# Patient Record
Sex: FEMALE | Race: BLACK OR AFRICAN AMERICAN | NOT HISPANIC OR LATINO | Employment: FULL TIME | URBAN - METROPOLITAN AREA
[De-identification: names, ages, dates, MRNs, and addresses within clinical notes are randomized per-mention and may not be internally consistent; named-entity substitution may affect disease eponyms.]

---

## 2017-01-25 ENCOUNTER — HOSPITAL ENCOUNTER (OUTPATIENT)
Dept: RADIOLOGY | Facility: HOSPITAL | Age: 47
Discharge: HOME | End: 2017-01-25
Attending: FAMILY MEDICINE
Payer: COMMERCIAL

## 2017-01-25 DIAGNOSIS — Z12.31 OTHER SCREENING MAMMOGRAM: ICD-10-CM

## 2017-01-25 PROCEDURE — 77067 SCR MAMMO BI INCL CAD: CPT | Mod: TC

## 2017-01-25 PROCEDURE — 77067 SCR MAMMO BI INCL CAD: CPT | Mod: 26,,, | Performed by: RADIOLOGY

## 2017-03-26 RX ORDER — ERGOCALCIFEROL 1.25 MG/1
CAPSULE ORAL
Qty: 8 CAPSULE | Refills: 0 | Status: SHIPPED | OUTPATIENT
Start: 2017-03-26 | End: 2017-06-26 | Stop reason: SDUPTHER

## 2017-03-26 RX ORDER — SIMVASTATIN 20 MG/1
TABLET, FILM COATED ORAL
Qty: 30 TABLET | Refills: 0 | Status: SHIPPED | OUTPATIENT
Start: 2017-03-26 | End: 2017-06-26 | Stop reason: SDUPTHER

## 2017-03-28 RX ORDER — OLMESARTAN MEDOXOMIL AND HYDROCHLOROTHIAZIDE 40/12.5 40; 12.5 MG/1; MG/1
TABLET ORAL
Qty: 30 TABLET | Refills: 0 | Status: SHIPPED | OUTPATIENT
Start: 2017-03-28 | End: 2017-07-03 | Stop reason: SDUPTHER

## 2017-04-06 RX ORDER — IBUPROFEN 800 MG/1
TABLET ORAL
Qty: 30 TABLET | Refills: 0 | Status: SHIPPED | OUTPATIENT
Start: 2017-04-06 | End: 2017-11-16 | Stop reason: SDUPTHER

## 2017-04-06 RX ORDER — CYCLOBENZAPRINE HCL 10 MG
TABLET ORAL
Qty: 30 TABLET | Refills: 0 | Status: SHIPPED | OUTPATIENT
Start: 2017-04-06 | End: 2017-11-16 | Stop reason: SDUPTHER

## 2017-07-03 RX ORDER — OLMESARTAN MEDOXOMIL AND HYDROCHLOROTHIAZIDE 40/12.5 40; 12.5 MG/1; MG/1
1 TABLET ORAL DAILY
Qty: 30 TABLET | Refills: 0 | Status: SHIPPED | OUTPATIENT
Start: 2017-07-03 | End: 2017-11-16 | Stop reason: SDUPTHER

## 2017-07-03 RX ORDER — ERGOCALCIFEROL 1.25 MG/1
CAPSULE ORAL
Qty: 12 CAPSULE | Refills: 0 | Status: SHIPPED | OUTPATIENT
Start: 2017-07-03 | End: 2017-11-16 | Stop reason: SDUPTHER

## 2017-07-03 RX ORDER — OLMESARTAN MEDOXOMIL AND HYDROCHLOROTHIAZIDE 40/12.5 40; 12.5 MG/1; MG/1
TABLET ORAL
Qty: 30 TABLET | Refills: 1 | Status: SHIPPED | OUTPATIENT
Start: 2017-07-03 | End: 2017-08-28 | Stop reason: SDUPTHER

## 2017-07-03 RX ORDER — SIMVASTATIN 20 MG/1
TABLET, FILM COATED ORAL
Qty: 90 TABLET | Refills: 0 | Status: SHIPPED | OUTPATIENT
Start: 2017-07-03 | End: 2017-11-16 | Stop reason: SDUPTHER

## 2017-07-03 NOTE — TELEPHONE ENCOUNTER
----- Message from Graciela Ruiz sent at 7/3/2017  8:21 AM CDT -----  Contact: Pt   States she is rtn nurses call rg her rx req and states to pls leave a message when calling back due to being unable to answer and can be reached at [phone]//[email or web address]

## 2017-08-28 ENCOUNTER — OFFICE VISIT (OUTPATIENT)
Dept: FAMILY MEDICINE | Facility: CLINIC | Age: 47
End: 2017-08-28
Payer: COMMERCIAL

## 2017-08-28 ENCOUNTER — LAB VISIT (OUTPATIENT)
Dept: LAB | Facility: HOSPITAL | Age: 47
End: 2017-08-28
Attending: FAMILY MEDICINE
Payer: COMMERCIAL

## 2017-08-28 VITALS
RESPIRATION RATE: 18 BRPM | OXYGEN SATURATION: 96 % | WEIGHT: 293 LBS | HEIGHT: 66 IN | SYSTOLIC BLOOD PRESSURE: 148 MMHG | DIASTOLIC BLOOD PRESSURE: 94 MMHG | TEMPERATURE: 96 F | BODY MASS INDEX: 47.09 KG/M2 | HEART RATE: 91 BPM

## 2017-08-28 DIAGNOSIS — E78.5 HYPERLIPIDEMIA, UNSPECIFIED HYPERLIPIDEMIA TYPE: ICD-10-CM

## 2017-08-28 DIAGNOSIS — M25.50 PAIN, JOINT, MULTIPLE SITES: ICD-10-CM

## 2017-08-28 DIAGNOSIS — E88.819 INSULIN RESISTANCE: ICD-10-CM

## 2017-08-28 DIAGNOSIS — E55.9 VITAMIN D DEFICIENCY DISEASE: ICD-10-CM

## 2017-08-28 DIAGNOSIS — I10 ESSENTIAL HYPERTENSION: Primary | ICD-10-CM

## 2017-08-28 DIAGNOSIS — I10 ESSENTIAL HYPERTENSION: ICD-10-CM

## 2017-08-28 LAB
25(OH)D3+25(OH)D2 SERPL-MCNC: 21 NG/ML
ALBUMIN SERPL BCP-MCNC: 3.5 G/DL
ALP SERPL-CCNC: 79 U/L
ALT SERPL W/O P-5'-P-CCNC: 12 U/L
ANION GAP SERPL CALC-SCNC: 8 MMOL/L
AST SERPL-CCNC: 15 U/L
BASOPHILS # BLD AUTO: 0.02 K/UL
BASOPHILS NFR BLD: 0.3 %
BILIRUB SERPL-MCNC: 0.6 MG/DL
BUN SERPL-MCNC: 14 MG/DL
CALCIUM SERPL-MCNC: 9.1 MG/DL
CCP AB SER IA-ACNC: <0.5 U/ML
CHLORIDE SERPL-SCNC: 106 MMOL/L
CHOLEST/HDLC SERPL: 3 {RATIO}
CO2 SERPL-SCNC: 25 MMOL/L
CREAT SERPL-MCNC: 0.9 MG/DL
CRP SERPL-MCNC: 7.2 MG/L
DIFFERENTIAL METHOD: ABNORMAL
EOSINOPHIL # BLD AUTO: 0 K/UL
EOSINOPHIL NFR BLD: 0.7 %
ERYTHROCYTE [DISTWIDTH] IN BLOOD BY AUTOMATED COUNT: 14.9 %
ERYTHROCYTE [SEDIMENTATION RATE] IN BLOOD BY WESTERGREN METHOD: 13 MM/HR
EST. GFR  (AFRICAN AMERICAN): >60 ML/MIN/1.73 M^2
EST. GFR  (NON AFRICAN AMERICAN): >60 ML/MIN/1.73 M^2
GLUCOSE SERPL-MCNC: 76 MG/DL
HCT VFR BLD AUTO: 41.1 %
HDL/CHOLESTEROL RATIO: 33 %
HDLC SERPL-MCNC: 212 MG/DL
HDLC SERPL-MCNC: 70 MG/DL
HGB BLD-MCNC: 13.2 G/DL
INSULIN COLLECTION INTERVAL: NORMAL
INSULIN SERPL-ACNC: 9.9 UU/ML
LDLC SERPL CALC-MCNC: 127 MG/DL
LYMPHOCYTES # BLD AUTO: 1.9 K/UL
LYMPHOCYTES NFR BLD: 33.2 %
MCH RBC QN AUTO: 25.3 PG
MCHC RBC AUTO-ENTMCNC: 32.1 G/DL
MCV RBC AUTO: 79 FL
MONOCYTES # BLD AUTO: 0.3 K/UL
MONOCYTES NFR BLD: 5.2 %
NEUTROPHILS # BLD AUTO: 3.5 K/UL
NEUTROPHILS NFR BLD: 60.3 %
NONHDLC SERPL-MCNC: 142 MG/DL
PLATELET # BLD AUTO: 201 K/UL
PMV BLD AUTO: 11 FL
POTASSIUM SERPL-SCNC: 3.9 MMOL/L
PROT SERPL-MCNC: 7.8 G/DL
RBC # BLD AUTO: 5.21 M/UL
RHEUMATOID FACT SERPL-ACNC: 13 IU/ML
SODIUM SERPL-SCNC: 139 MMOL/L
TRIGL SERPL-MCNC: 75 MG/DL
TSH SERPL DL<=0.005 MIU/L-ACNC: 3.74 UIU/ML
WBC # BLD AUTO: 5.81 K/UL

## 2017-08-28 PROCEDURE — 84443 ASSAY THYROID STIM HORMONE: CPT

## 2017-08-28 PROCEDURE — 85651 RBC SED RATE NONAUTOMATED: CPT

## 2017-08-28 PROCEDURE — 86200 CCP ANTIBODY: CPT

## 2017-08-28 PROCEDURE — 80053 COMPREHEN METABOLIC PANEL: CPT

## 2017-08-28 PROCEDURE — 83525 ASSAY OF INSULIN: CPT

## 2017-08-28 PROCEDURE — 86431 RHEUMATOID FACTOR QUANT: CPT

## 2017-08-28 PROCEDURE — 99214 OFFICE O/P EST MOD 30 MIN: CPT | Mod: S$GLB,,, | Performed by: FAMILY MEDICINE

## 2017-08-28 PROCEDURE — 80061 LIPID PANEL: CPT

## 2017-08-28 PROCEDURE — 86038 ANTINUCLEAR ANTIBODIES: CPT

## 2017-08-28 PROCEDURE — 85025 COMPLETE CBC W/AUTO DIFF WBC: CPT

## 2017-08-28 PROCEDURE — 3077F SYST BP >= 140 MM HG: CPT | Mod: S$GLB,,, | Performed by: FAMILY MEDICINE

## 2017-08-28 PROCEDURE — 86140 C-REACTIVE PROTEIN: CPT

## 2017-08-28 PROCEDURE — 86039 ANTINUCLEAR ANTIBODIES (ANA): CPT

## 2017-08-28 PROCEDURE — 99999 PR PBB SHADOW E&M-EST. PATIENT-LVL IV: CPT | Mod: PBBFAC,,, | Performed by: FAMILY MEDICINE

## 2017-08-28 PROCEDURE — 82306 VITAMIN D 25 HYDROXY: CPT

## 2017-08-28 PROCEDURE — 86225 DNA ANTIBODY NATIVE: CPT

## 2017-08-28 PROCEDURE — 86235 NUCLEAR ANTIGEN ANTIBODY: CPT | Mod: 59

## 2017-08-28 PROCEDURE — 36415 COLL VENOUS BLD VENIPUNCTURE: CPT | Mod: PO

## 2017-08-28 PROCEDURE — 3008F BODY MASS INDEX DOCD: CPT | Mod: S$GLB,,, | Performed by: FAMILY MEDICINE

## 2017-08-28 PROCEDURE — 83036 HEMOGLOBIN GLYCOSYLATED A1C: CPT

## 2017-08-28 PROCEDURE — 3080F DIAST BP >= 90 MM HG: CPT | Mod: S$GLB,,, | Performed by: FAMILY MEDICINE

## 2017-08-28 NOTE — PROGRESS NOTES
Subjective:       Patient ID: Aliza Sagastume is a 46 y.o. female.    Chief Complaint: Knee Pain; Hip Pain; and Ankle Pain    Ms. Beavers comes in today with complaint of having pain at her hips, knees and ankles (more so at left knee and right ankle).  She reports pain occurs intermittently and for some time.  She reports knee pains at 4-5/10 on pain scale today and more painful with bending.  She states she participates in [place] 2 times a week.  She states her discomforts improve as she moves around.  She denies associated trauma but reports swelling in her ankles which resolves with elevation.  She states she takes ibuprofen prn and takes muscle relaxant less as she states it makes her sleepy.  She states medications do help for pain.    She is fasting and has not taken medication today.    She denies having fever, chills, fatigue, appetite change; shortness of breath, cough, wheezing; chest pain, palpitations, leg swelling; abdominal pain, nausea, vomiting, diarrhea, constipation; unusual urinary symptoms; back pain; headaches; anxiety or depression.    Current Outpatient Prescriptions:  CYANOCOBALAMIN, VITAMIN B-12, (VITAMIN B-12 ORAL), Take by mouth.  cyclobenzaprine (FLEXERIL) 10 MG tablet, TAKE 1 TABLET(10 MG) BY MOUTH EVERY EVENING AS NEEDED FOR MUSCLE SPASMS  ergocalciferol (ERGOCALCIFEROL) 50,000 unit Cap, TAKE 1 CAPSULE BY MOUTH TWICE WEEKLY(ON MONDAY AND FRIDAY)  ibuprofen (ADVIL,MOTRIN) 800 MG tablet, TAKE 1 TABLET BY MOUTH TWICE DAILY WITH MEALS  multivitamin capsule, Take 1 capsule by mouth once daily.  olmesartan-hydrochlorothiazide (BENICAR HCT) 40-12.5 mg Tab, Take 1 tablet by mouth once daily.  simvastatin (ZOCOR) 20 MG tablet, TAKE 1 TABLET(20 MG) BY MOUTH EVERY EVENING          Knee Pain      Hip Pain      Ankle Pain        Review of Systems   Constitutional: Positive for activity change and unexpected weight change. Negative for appetite change, chills, fatigue and fever.   HENT: Negative  for hearing loss, rhinorrhea and trouble swallowing.    Eyes: Positive for visual disturbance. Negative for discharge.   Respiratory: Negative for cough, chest tightness, shortness of breath and wheezing.    Cardiovascular: Negative for chest pain and palpitations.   Gastrointestinal: Negative for blood in stool, constipation, diarrhea, nausea and vomiting.   Endocrine: Positive for polyuria. Negative for polydipsia.   Genitourinary: Negative for difficulty urinating, dysuria, hematuria and menstrual problem.   Musculoskeletal: Positive for arthralgias and joint swelling. Negative for back pain and neck pain.   Neurological: Positive for weakness and headaches.   Psychiatric/Behavioral: Positive for dysphoric mood. Negative for confusion. The patient is not nervous/anxious.        Objective:      Physical Exam   Constitutional: She is oriented to person, place, and time. She appears well-developed and well-nourished. No distress.   Pleasant.   Neck: Normal range of motion. Neck supple. No thyromegaly present.   Cardiovascular: Normal rate, regular rhythm, normal heart sounds and intact distal pulses.    No murmur heard.  Pulmonary/Chest: Effort normal and breath sounds normal. No respiratory distress. She has no wheezes.   Abdominal: Soft. Bowel sounds are normal. She exhibits no distension and no mass. There is no tenderness. There is no rebound and no guarding.   Musculoskeletal: Normal range of motion. She exhibits tenderness. She exhibits no edema.   Non tender back, hips, shoulders, knees, ankles with full range of motion noted.  Slight crepitance at left knee noted.  She is ambulatory without problems.   Lymphadenopathy:     She has no cervical adenopathy.   Neurological: She is alert and oriented to person, place, and time.   Skin: She is not diaphoretic.   Psychiatric: She has a normal mood and affect. Her behavior is normal. Judgment and thought content normal.   Vitals reviewed.      Assessment:       1.  Essential hypertension    2. Hyperlipidemia, unspecified hyperlipidemia type    3. Vitamin D deficiency disease    4. Insulin resistance    5. Pain, joint, multiple sites        Plan:       1.  Labs:  TSH, A1c, lipid panel, Insulin, CMP, CBC, Urinalysis, Vitamin D, CEDRICK, CRP, ESR, RF, CCP-Antibody.  Patient advised to call for results/further recommendations.  2.  Continue current medications, follow low sodium, low cholesterol, low carb diet, daily walks.

## 2017-08-29 LAB
ANA SER QL IF: POSITIVE
ANA TITR SER IF: NORMAL {TITER}
ESTIMATED AVG GLUCOSE: 123 MG/DL
HBA1C MFR BLD HPLC: 5.9 %

## 2017-08-31 LAB
ANTI SM ANTIBODY: 1.05 EU
ANTI SM/RNP ANTIBODY: 1.4 EU
ANTI-SM INTERPRETATION: NEGATIVE
ANTI-SM/RNP INTERPRETATION: NEGATIVE
ANTI-SSA ANTIBODY: 0.9 EU
ANTI-SSA INTERPRETATION: NEGATIVE
ANTI-SSB ANTIBODY: 0.76 EU
ANTI-SSB INTERPRETATION: NEGATIVE
DSDNA AB SER-ACNC: NORMAL [IU]/ML

## 2017-11-17 RX ORDER — SIMVASTATIN 20 MG/1
TABLET, FILM COATED ORAL
Qty: 90 TABLET | Refills: 0 | Status: SHIPPED | OUTPATIENT
Start: 2017-11-17 | End: 2018-02-22 | Stop reason: SDUPTHER

## 2017-11-17 RX ORDER — OLMESARTAN MEDOXOMIL AND HYDROCHLOROTHIAZIDE 40/12.5 40; 12.5 MG/1; MG/1
1 TABLET ORAL DAILY
Qty: 90 TABLET | Refills: 0 | Status: SHIPPED | OUTPATIENT
Start: 2017-11-17 | End: 2017-12-14 | Stop reason: SDUPTHER

## 2017-11-17 RX ORDER — ERGOCALCIFEROL 1.25 MG/1
CAPSULE ORAL
Qty: 12 CAPSULE | Refills: 0 | Status: SHIPPED | OUTPATIENT
Start: 2017-11-17 | End: 2017-12-14 | Stop reason: SDUPTHER

## 2017-11-17 RX ORDER — IBUPROFEN 800 MG/1
800 TABLET ORAL 2 TIMES DAILY WITH MEALS
Qty: 30 TABLET | Refills: 0 | Status: SHIPPED | OUTPATIENT
Start: 2017-11-17 | End: 2017-12-14 | Stop reason: SDUPTHER

## 2017-11-17 RX ORDER — CYCLOBENZAPRINE HCL 10 MG
TABLET ORAL
Qty: 30 TABLET | Refills: 0 | Status: SHIPPED | OUTPATIENT
Start: 2017-11-17 | End: 2018-02-22 | Stop reason: SDUPTHER

## 2017-11-22 ENCOUNTER — TELEPHONE (OUTPATIENT)
Dept: FAMILY MEDICINE | Facility: CLINIC | Age: 47
End: 2017-11-22

## 2017-11-22 NOTE — TELEPHONE ENCOUNTER
----- Message from Magdy Floyd sent at 11/22/2017  8:20 AM CST -----  Contact: Pt  Pt states she is returning the nurse call, please contact the pt at [phone]

## 2017-12-15 RX ORDER — ERGOCALCIFEROL 1.25 MG/1
CAPSULE ORAL
Qty: 12 CAPSULE | Refills: 0 | Status: SHIPPED | OUTPATIENT
Start: 2017-12-15 | End: 2018-02-22 | Stop reason: SDUPTHER

## 2017-12-15 RX ORDER — IBUPROFEN 800 MG/1
800 TABLET ORAL 2 TIMES DAILY WITH MEALS
Qty: 30 TABLET | Refills: 2 | Status: SHIPPED | OUTPATIENT
Start: 2017-12-15 | End: 2018-02-22 | Stop reason: SDUPTHER

## 2017-12-15 RX ORDER — OLMESARTAN MEDOXOMIL AND HYDROCHLOROTHIAZIDE 40/12.5 40; 12.5 MG/1; MG/1
1 TABLET ORAL DAILY
Qty: 90 TABLET | Refills: 0 | Status: SHIPPED | OUTPATIENT
Start: 2017-12-15 | End: 2018-02-22 | Stop reason: SDUPTHER

## 2018-02-07 ENCOUNTER — PATIENT OUTREACH (OUTPATIENT)
Dept: ADMINISTRATIVE | Facility: HOSPITAL | Age: 48
End: 2018-02-07

## 2018-02-26 ENCOUNTER — TELEPHONE (OUTPATIENT)
Dept: FAMILY MEDICINE | Facility: CLINIC | Age: 48
End: 2018-02-26

## 2018-02-26 NOTE — TELEPHONE ENCOUNTER
----- Message from Oxana Heaton sent at 2/26/2018  1:56 PM CST -----  Please call pt back at [phone]. Pt miss call on Friday.

## 2018-02-26 NOTE — TELEPHONE ENCOUNTER
----- Message from Kayla Carrasco sent at 2/26/2018  2:37 PM CST -----  Contact: Patient  Patient is returning a call, please call them back at [phone]. Thank you

## 2018-03-01 RX ORDER — CYCLOBENZAPRINE HCL 10 MG
TABLET ORAL
Qty: 30 TABLET | Refills: 0 | Status: SHIPPED | OUTPATIENT
Start: 2018-03-01 | End: 2018-06-15

## 2018-03-01 RX ORDER — IBUPROFEN 800 MG/1
800 TABLET ORAL 2 TIMES DAILY WITH MEALS
Qty: 30 TABLET | Refills: 2 | Status: SHIPPED | OUTPATIENT
Start: 2018-03-01 | End: 2018-06-15

## 2018-03-01 RX ORDER — OLMESARTAN MEDOXOMIL AND HYDROCHLOROTHIAZIDE 40/12.5 40; 12.5 MG/1; MG/1
1 TABLET ORAL DAILY
Qty: 90 TABLET | Refills: 0 | Status: SHIPPED | OUTPATIENT
Start: 2018-03-01 | End: 2018-06-15 | Stop reason: DRUGHIGH

## 2018-03-01 RX ORDER — SIMVASTATIN 20 MG/1
TABLET, FILM COATED ORAL
Qty: 90 TABLET | Refills: 0 | Status: SHIPPED | OUTPATIENT
Start: 2018-03-01 | End: 2018-06-18 | Stop reason: SDUPTHER

## 2018-03-01 RX ORDER — ERGOCALCIFEROL 1.25 MG/1
CAPSULE ORAL
Qty: 12 CAPSULE | Refills: 0 | Status: SHIPPED | OUTPATIENT
Start: 2018-03-01 | End: 2018-07-25 | Stop reason: SDUPTHER

## 2018-03-01 NOTE — TELEPHONE ENCOUNTER
"Please advise pt I attempted to reach her by phone for "correct" pharmacy as a [facility] was listed on her email request. I hope we got it correct. Thanks.  "

## 2018-03-26 RX ORDER — IBUPROFEN 800 MG/1
TABLET ORAL
Qty: 30 TABLET | Refills: 0 | Status: SHIPPED | OUTPATIENT
Start: 2018-03-26 | End: 2019-04-30 | Stop reason: SDUPTHER

## 2018-03-26 RX ORDER — CYCLOBENZAPRINE HCL 10 MG
TABLET ORAL
Qty: 30 TABLET | Refills: 0 | Status: SHIPPED | OUTPATIENT
Start: 2018-03-26 | End: 2019-04-30 | Stop reason: SDUPTHER

## 2018-05-03 ENCOUNTER — TELEPHONE (OUTPATIENT)
Dept: FAMILY MEDICINE | Facility: CLINIC | Age: 48
End: 2018-05-03

## 2018-05-03 NOTE — TELEPHONE ENCOUNTER
----- Message from Ligia Andino sent at 5/3/2018  8:01 AM CDT -----  Contact: pt  The pt request a call concerning her  5/28 appt and the pt wants to schedule a mammo, no orders in the system, the pt can be reached at [phone]///[email or web address]

## 2018-05-15 ENCOUNTER — PATIENT OUTREACH (OUTPATIENT)
Dept: ADMINISTRATIVE | Facility: HOSPITAL | Age: 48
End: 2018-05-15

## 2018-06-10 DIAGNOSIS — Z12.11 SPECIAL SCREENING FOR MALIGNANT NEOPLASMS, COLON: Primary | ICD-10-CM

## 2018-06-15 ENCOUNTER — OFFICE VISIT (OUTPATIENT)
Dept: CARDIOLOGY | Facility: CLINIC | Age: 48
End: 2018-06-15
Payer: COMMERCIAL

## 2018-06-15 VITALS
HEART RATE: 78 BPM | BODY MASS INDEX: 47.09 KG/M2 | DIASTOLIC BLOOD PRESSURE: 82 MMHG | SYSTOLIC BLOOD PRESSURE: 140 MMHG | WEIGHT: 293 LBS | HEIGHT: 66 IN

## 2018-06-15 DIAGNOSIS — E66.9 OBESITY, UNSPECIFIED CLASSIFICATION, UNSPECIFIED OBESITY TYPE, UNSPECIFIED WHETHER SERIOUS COMORBIDITY PRESENT: ICD-10-CM

## 2018-06-15 DIAGNOSIS — I10 ESSENTIAL HYPERTENSION: Primary | ICD-10-CM

## 2018-06-15 DIAGNOSIS — R06.83 SNORES: ICD-10-CM

## 2018-06-15 DIAGNOSIS — E78.5 HYPERLIPIDEMIA, UNSPECIFIED HYPERLIPIDEMIA TYPE: ICD-10-CM

## 2018-06-15 DIAGNOSIS — I10 HYPERTENSION, UNSPECIFIED TYPE: ICD-10-CM

## 2018-06-15 DIAGNOSIS — I73.9 PVD (PERIPHERAL VASCULAR DISEASE): ICD-10-CM

## 2018-06-15 PROCEDURE — 99204 OFFICE O/P NEW MOD 45 MIN: CPT | Mod: S$GLB,,, | Performed by: INTERNAL MEDICINE

## 2018-06-15 PROCEDURE — 99999 PR PBB SHADOW E&M-EST. PATIENT-LVL IV: CPT | Mod: PBBFAC,,, | Performed by: INTERNAL MEDICINE

## 2018-06-15 PROCEDURE — 93000 ELECTROCARDIOGRAM COMPLETE: CPT | Mod: S$GLB,,, | Performed by: NUCLEAR MEDICINE

## 2018-06-15 RX ORDER — OLMESARTAN MEDOXOMIL AND HYDROCHLOROTHIAZIDE 40/25 40; 25 MG/1; MG/1
1 TABLET ORAL DAILY
Qty: 30 TABLET | Refills: 11 | Status: SHIPPED | OUTPATIENT
Start: 2018-06-15 | End: 2018-06-18 | Stop reason: SDUPTHER

## 2018-06-15 NOTE — PROGRESS NOTES
Subjective:   Patient ID:  Aliza Sagastume is a 47 y.o. female who presents for evaluation of Establish Care (screening)      46 yo, female, referred for care establish. Office work  PMH HTN, HLD, obesity, preDM, snoring.  Feels fatigue.  Some sharp chest pain, on-and-off. Once a month.   LEAL if walking fast.  Has knee pain while walking  Ankle well at night and better in morning.   Pre-screen HARRIET at the weekend showed mild Dz.  EKG NSR  No smoking and occasional drinking  Father 1st heart attack at 60. Mother HTN. No f/h premature CAD.          Past Medical History:   Diagnosis Date    CEDRICK positive 08/2017    High cholesterol     History of vitamin D deficiency     Hypertension     Hypothyroidism     Kidney stones     Pre-diabetes     Snoring        Past Surgical History:   Procedure Laterality Date    CYSTOSCOPY W/ LASER LITHOTRIPSY      x 2    DILATION AND CURETTAGE OF UTERUS      x1 for abnormal bleeding       Social History   Substance Use Topics    Smoking status: Never Smoker    Smokeless tobacco: Never Used    Alcohol use No       Family History   Problem Relation Age of Onset    Hypertension Mother     Heart disease Father 69        MI    Hypertension Father     Diabetes Father     Cancer Father         prostate cancer    Hypertension Sister     Cancer Paternal Aunt         Brain cancer    Cancer Paternal Uncle         Pancreas cancer    Hypertension Maternal Grandmother     Hypertension Maternal Grandfather     Stroke Maternal Grandfather 86    Hypertension Paternal Grandmother     Hypertension Paternal Grandfather     No Known Problems Daughter     No Known Problems Daughter     Cancer Cousin         Breast cancer       Review of Systems   Constitution: Positive for malaise/fatigue. Negative for decreased appetite, diaphoresis, fever, weakness and night sweats.   HENT: Negative for nosebleeds.    Eyes: Negative for blurred vision and double vision.   Cardiovascular:  Positive for dyspnea on exertion. Negative for chest pain, claudication, irregular heartbeat, leg swelling, near-syncope, orthopnea, palpitations, paroxysmal nocturnal dyspnea and syncope.   Respiratory: Positive for snoring. Negative for cough, shortness of breath, sleep disturbances due to breathing, sputum production and wheezing.    Endocrine: Negative for cold intolerance and polyuria.   Hematologic/Lymphatic: Does not bruise/bleed easily.   Skin: Negative for rash.   Musculoskeletal: Negative for back pain, falls, joint pain, joint swelling and neck pain.   Gastrointestinal: Negative for abdominal pain, heartburn, nausea and vomiting.   Genitourinary: Negative for dysuria, frequency and hematuria.   Neurological: Negative for difficulty with concentration, dizziness, focal weakness, headaches, light-headedness, numbness and seizures.   Psychiatric/Behavioral: Negative for depression, memory loss and substance abuse. The patient does not have insomnia.    Allergic/Immunologic: Negative for HIV exposure and hives.       Objective:   Physical Exam   Constitutional: She is oriented to person, place, and time. She appears well-nourished.   HENT:   Head: Normocephalic.   Eyes: Pupils are equal, round, and reactive to light.   Neck: Normal carotid pulses and no JVD present. Carotid bruit is not present. No thyromegaly present.   Cardiovascular: Normal rate, regular rhythm, normal heart sounds and normal pulses.   No extrasystoles are present. PMI is not displaced.  Exam reveals no gallop and no S3.    No murmur heard.  Pulmonary/Chest: Breath sounds normal. No stridor. No respiratory distress.   Abdominal: Soft. Bowel sounds are normal. There is no tenderness. There is no rebound.   Musculoskeletal: Normal range of motion.   Neurological: She is alert and oriented to person, place, and time.   Skin: Skin is intact. No rash noted.   Psychiatric: Her behavior is normal.       Lab Results   Component Value Date    CHOL  212 (H) 08/28/2017    CHOL 224 (H) 03/15/2016    CHOL 182 05/28/2013     Lab Results   Component Value Date    HDL 70 08/28/2017    HDL 77 (H) 03/15/2016    HDL 54 05/28/2013     Lab Results   Component Value Date    LDLCALC 127.0 08/28/2017    LDLCALC 131.8 03/15/2016    LDLCALC 115.0 05/28/2013     Lab Results   Component Value Date    TRIG 75 08/28/2017    TRIG 76 03/15/2016    TRIG 65 05/28/2013     Lab Results   Component Value Date    CHOLHDL 33.0 08/28/2017    CHOLHDL 34.4 03/15/2016    CHOLHDL 29.7 05/28/2013       Chemistry        Component Value Date/Time     08/28/2017 1416    K 3.9 08/28/2017 1416     08/28/2017 1416    CO2 25 08/28/2017 1416    BUN 14 08/28/2017 1416    CREATININE 0.9 08/28/2017 1416    GLU 76 08/28/2017 1416        Component Value Date/Time    CALCIUM 9.1 08/28/2017 1416    ALKPHOS 79 08/28/2017 1416    AST 15 08/28/2017 1416    ALT 12 08/28/2017 1416    BILITOT 0.6 08/28/2017 1416    ESTGFRAFRICA >60.0 08/28/2017 1416    EGFRNONAA >60.0 08/28/2017 1416          Lab Results   Component Value Date    TSH 3.741 08/28/2017     Lab Results   Component Value Date    INR 1.0 07/30/2013     Lab Results   Component Value Date    WBC 5.81 08/28/2017    HGB 13.2 08/28/2017    HCT 41.1 08/28/2017    MCV 79 (L) 08/28/2017     08/28/2017     BNP  @LABRCNTIP(BNP,BNPTRIAGEBLO)@  CrCl cannot be calculated (Patient's most recent lab result is older than the maximum 7 days allowed.).     Assessment:      1. Essential hypertension    2. Hyperlipidemia, unspecified hyperlipidemia type    3. Obesity, unspecified classification, unspecified obesity type, unspecified whether serious comorbidity present    4. Snores    5. HTN (hypertension)    6. PVD (peripheral vascular disease)      Ankle swelling due to venous insufficiency  Snore and  Fatigue needs r/o SHAWNA  EKG NSR and LAE  Plan:   HARRIET for leg pain  ECHO  Refer to sleep study  Increase HCTZ to 25 mg daily  DASH  Weight control  Continue  zocor.  F/u with PCP

## 2018-06-18 DIAGNOSIS — I73.9 PVD (PERIPHERAL VASCULAR DISEASE): ICD-10-CM

## 2018-06-18 RX ORDER — SIMVASTATIN 20 MG/1
TABLET, FILM COATED ORAL
Qty: 90 TABLET | Refills: 0 | Status: SHIPPED | OUTPATIENT
Start: 2018-06-18 | End: 2018-07-25 | Stop reason: SDUPTHER

## 2018-06-18 RX ORDER — OLMESARTAN MEDOXOMIL AND HYDROCHLOROTHIAZIDE 40/25 40; 25 MG/1; MG/1
1 TABLET ORAL DAILY
Qty: 30 TABLET | Refills: 11 | Status: SHIPPED | OUTPATIENT
Start: 2018-06-18 | End: 2018-07-25 | Stop reason: SDUPTHER

## 2018-06-18 RX ORDER — OLMESARTAN MEDOXOMIL AND HYDROCHLOROTHIAZIDE 40/12.5 40; 12.5 MG/1; MG/1
TABLET ORAL
Qty: 30 TABLET | Refills: 0 | Status: SHIPPED | OUTPATIENT
Start: 2018-06-18 | End: 2018-07-25

## 2018-06-18 RX ORDER — ERGOCALCIFEROL 1.25 MG/1
CAPSULE ORAL
Qty: 12 CAPSULE | Refills: 0 | Status: SHIPPED | OUTPATIENT
Start: 2018-06-18 | End: 2018-07-25 | Stop reason: SDUPTHER

## 2018-06-22 ENCOUNTER — TELEPHONE (OUTPATIENT)
Dept: CARDIOLOGY | Facility: CLINIC | Age: 48
End: 2018-06-22

## 2018-06-22 NOTE — TELEPHONE ENCOUNTER
Pt cx echo and HARRIET stating:    I received a message that my insurance requires me to pay $350 deductible today. I am not prepared to pay that today, therefore I must cancel this appointment.

## 2018-07-12 ENCOUNTER — PATIENT MESSAGE (OUTPATIENT)
Dept: ADMINISTRATIVE | Facility: OTHER | Age: 48
End: 2018-07-12

## 2018-07-25 ENCOUNTER — LAB VISIT (OUTPATIENT)
Dept: LAB | Facility: HOSPITAL | Age: 48
End: 2018-07-25
Attending: FAMILY MEDICINE
Payer: COMMERCIAL

## 2018-07-25 ENCOUNTER — OFFICE VISIT (OUTPATIENT)
Dept: FAMILY MEDICINE | Facility: CLINIC | Age: 48
End: 2018-07-25
Payer: COMMERCIAL

## 2018-07-25 VITALS
WEIGHT: 293 LBS | SYSTOLIC BLOOD PRESSURE: 162 MMHG | OXYGEN SATURATION: 100 % | TEMPERATURE: 99 F | BODY MASS INDEX: 47.09 KG/M2 | HEIGHT: 66 IN | HEART RATE: 74 BPM | DIASTOLIC BLOOD PRESSURE: 104 MMHG

## 2018-07-25 DIAGNOSIS — Z00.00 ANNUAL PHYSICAL EXAM: Primary | ICD-10-CM

## 2018-07-25 DIAGNOSIS — E78.5 HYPERLIPIDEMIA, UNSPECIFIED HYPERLIPIDEMIA TYPE: ICD-10-CM

## 2018-07-25 DIAGNOSIS — R73.03 PRE-DIABETES: ICD-10-CM

## 2018-07-25 DIAGNOSIS — E88.819 INSULIN RESISTANCE: ICD-10-CM

## 2018-07-25 DIAGNOSIS — I73.9 PVD (PERIPHERAL VASCULAR DISEASE): ICD-10-CM

## 2018-07-25 DIAGNOSIS — E55.9 VITAMIN D DEFICIENCY DISEASE: ICD-10-CM

## 2018-07-25 DIAGNOSIS — E66.01 MORBID OBESITY WITH BMI OF 50.0-59.9, ADULT: ICD-10-CM

## 2018-07-25 DIAGNOSIS — Z00.00 ANNUAL PHYSICAL EXAM: ICD-10-CM

## 2018-07-25 DIAGNOSIS — Z12.31 VISIT FOR SCREENING MAMMOGRAM: ICD-10-CM

## 2018-07-25 DIAGNOSIS — I10 HYPERTENSION, UNSPECIFIED TYPE: ICD-10-CM

## 2018-07-25 LAB
25(OH)D3+25(OH)D2 SERPL-MCNC: 16 NG/ML
ALBUMIN SERPL BCP-MCNC: 3.7 G/DL
ALP SERPL-CCNC: 80 U/L
ALT SERPL W/O P-5'-P-CCNC: 11 U/L
AMORPH CRY UR QL COMP ASSIST: ABNORMAL
ANION GAP SERPL CALC-SCNC: 10 MMOL/L
AST SERPL-CCNC: 15 U/L
BASOPHILS # BLD AUTO: 0.03 K/UL
BASOPHILS NFR BLD: 0.5 %
BILIRUB SERPL-MCNC: 0.4 MG/DL
BILIRUB UR QL STRIP: NEGATIVE
BUN SERPL-MCNC: 20 MG/DL
CALCIUM SERPL-MCNC: 8.9 MG/DL
CAOX CRY UR QL COMP ASSIST: ABNORMAL
CHLORIDE SERPL-SCNC: 107 MMOL/L
CHOLEST SERPL-MCNC: 211 MG/DL
CHOLEST/HDLC SERPL: 2.9 {RATIO}
CLARITY UR REFRACT.AUTO: ABNORMAL
CO2 SERPL-SCNC: 23 MMOL/L
COLOR UR AUTO: YELLOW
CREAT SERPL-MCNC: 0.9 MG/DL
DIFFERENTIAL METHOD: ABNORMAL
EOSINOPHIL # BLD AUTO: 0.1 K/UL
EOSINOPHIL NFR BLD: 1 %
ERYTHROCYTE [DISTWIDTH] IN BLOOD BY AUTOMATED COUNT: 14.9 %
EST. GFR  (AFRICAN AMERICAN): >60 ML/MIN/1.73 M^2
EST. GFR  (NON AFRICAN AMERICAN): >60 ML/MIN/1.73 M^2
GLUCOSE SERPL-MCNC: 99 MG/DL
GLUCOSE UR QL STRIP: NEGATIVE
HCT VFR BLD AUTO: 44.4 %
HDLC SERPL-MCNC: 72 MG/DL
HDLC SERPL: 34.1 %
HGB BLD-MCNC: 13.6 G/DL
HGB UR QL STRIP: ABNORMAL
IMM GRANULOCYTES # BLD AUTO: 0.02 K/UL
IMM GRANULOCYTES NFR BLD AUTO: 0.3 %
INSULIN COLLECTION INTERVAL: NORMAL
INSULIN SERPL-ACNC: 13.6 UU/ML
KETONES UR QL STRIP: NEGATIVE
LDLC SERPL CALC-MCNC: 124.6 MG/DL
LEUKOCYTE ESTERASE UR QL STRIP: NEGATIVE
LYMPHOCYTES # BLD AUTO: 1.7 K/UL
LYMPHOCYTES NFR BLD: 28.6 %
MCH RBC QN AUTO: 26 PG
MCHC RBC AUTO-ENTMCNC: 30.6 G/DL
MCV RBC AUTO: 85 FL
MICROSCOPIC COMMENT: ABNORMAL
MONOCYTES # BLD AUTO: 0.4 K/UL
MONOCYTES NFR BLD: 6 %
NEUTROPHILS # BLD AUTO: 3.8 K/UL
NEUTROPHILS NFR BLD: 63.6 %
NITRITE UR QL STRIP: NEGATIVE
NONHDLC SERPL-MCNC: 139 MG/DL
NRBC BLD-RTO: 0 /100 WBC
PH UR STRIP: 5 [PH] (ref 5–8)
PLATELET # BLD AUTO: 200 K/UL
PMV BLD AUTO: 12.2 FL
POTASSIUM SERPL-SCNC: 3.9 MMOL/L
PROT SERPL-MCNC: 7.7 G/DL
PROT UR QL STRIP: NEGATIVE
RBC # BLD AUTO: 5.24 M/UL
RBC #/AREA URNS AUTO: 26 /HPF (ref 0–4)
SODIUM SERPL-SCNC: 140 MMOL/L
SP GR UR STRIP: 1.02 (ref 1–1.03)
SQUAMOUS #/AREA URNS AUTO: 7 /HPF
TRIGL SERPL-MCNC: 72 MG/DL
TSH SERPL DL<=0.005 MIU/L-ACNC: 3.22 UIU/ML
URATE CRY UR QL COMP ASSIST: ABNORMAL
URN SPEC COLLECT METH UR: ABNORMAL
UROBILINOGEN UR STRIP-ACNC: NEGATIVE EU/DL
WBC # BLD AUTO: 5.98 K/UL

## 2018-07-25 PROCEDURE — 99396 PREV VISIT EST AGE 40-64: CPT | Mod: S$GLB,,, | Performed by: FAMILY MEDICINE

## 2018-07-25 PROCEDURE — 83036 HEMOGLOBIN GLYCOSYLATED A1C: CPT

## 2018-07-25 PROCEDURE — 85025 COMPLETE CBC W/AUTO DIFF WBC: CPT

## 2018-07-25 PROCEDURE — 82306 VITAMIN D 25 HYDROXY: CPT

## 2018-07-25 PROCEDURE — 80053 COMPREHEN METABOLIC PANEL: CPT

## 2018-07-25 PROCEDURE — 83525 ASSAY OF INSULIN: CPT

## 2018-07-25 PROCEDURE — 81001 URINALYSIS AUTO W/SCOPE: CPT

## 2018-07-25 PROCEDURE — 80061 LIPID PANEL: CPT

## 2018-07-25 PROCEDURE — 84443 ASSAY THYROID STIM HORMONE: CPT

## 2018-07-25 PROCEDURE — 99999 PR PBB SHADOW E&M-EST. PATIENT-LVL IV: CPT | Mod: PBBFAC,,, | Performed by: FAMILY MEDICINE

## 2018-07-25 RX ORDER — OLMESARTAN MEDOXOMIL AND HYDROCHLOROTHIAZIDE 40/25 40; 25 MG/1; MG/1
1 TABLET ORAL DAILY
Qty: 90 TABLET | Refills: 1 | Status: SHIPPED | OUTPATIENT
Start: 2018-07-25 | End: 2019-03-11 | Stop reason: SDUPTHER

## 2018-07-25 RX ORDER — METFORMIN HYDROCHLORIDE 500 MG/1
500 TABLET ORAL 2 TIMES DAILY WITH MEALS
Qty: 180 TABLET | Refills: 0 | Status: SHIPPED | OUTPATIENT
Start: 2018-07-25 | End: 2019-03-17 | Stop reason: SDUPTHER

## 2018-07-25 RX ORDER — ERGOCALCIFEROL 1.25 MG/1
CAPSULE ORAL
Qty: 12 CAPSULE | Refills: 1 | Status: SHIPPED | OUTPATIENT
Start: 2018-07-25 | End: 2019-03-11 | Stop reason: SDUPTHER

## 2018-07-25 RX ORDER — SIMVASTATIN 20 MG/1
20 TABLET, FILM COATED ORAL NIGHTLY
Qty: 90 TABLET | Refills: 1 | Status: SHIPPED | OUTPATIENT
Start: 2018-07-25 | End: 2019-03-11 | Stop reason: SDUPTHER

## 2018-07-25 NOTE — PROGRESS NOTES
HISTORY OF PRESENT ILLNESS: Ms. Sagastume who comes in today for annual wellness examination. She is fasting and has not taken medication today (states has not taken medications in several days following returning from vacation).      END OF LIFE DECISION: She does not have a living will and does desire life support.    Current Outpatient Prescriptions   Medication Sig    cyclobenzaprine (FLEXERIL) 10 MG tablet TAKE 1 TABLET(10 MG) BY MOUTH EVERY EVENING AS NEEDED FOR MUSCLE SPASMS    ergocalciferol (ERGOCALCIFEROL) 50,000 unit Cap TAKE 1 CAPSULE BY MOUTH TWICE WEEKLY(ON MONDAY AND FRIDAY)    ibuprofen (ADVIL,MOTRIN) 800 MG tablet TAKE 1 TABLET BY MOUTH TWICE DAILY WITH MEALS    multivitamin capsule Take 1 capsule by mouth once daily.    olmesartan-hydrochlorothiazide (BENICAR HCT) 40-25 mg per tablet Take 1 tablet by mouth once daily.    simvastatin (ZOCOR) 20 MG tablet TAKE 1 TABLET(20 MG) BY MOUTH EVERY EVENING (Patient taking differently: Take 20 mg by mouth every evening. TAKE 1 TABLET(20 MG) BY MOUTH EVERY EVENING)     SCREENINGS:   Cholesterol: August 27, 2017.  FFS/Colonoscopy: Never.  Mammogram: January 25, 2017 - okay per patient.  GYN Exam (breast): August 28, 2015 - okay. She states she will follow up with GYN for pap smear.  Dexa Scan: Never.  Eye Exam: Never. Years ago per patient.  She wears reading glasses for the computer.  PPD: Never.  Immunizations: Td/Tdap - more than 10 years ago; she declines.  Gardasil - N./A.  Zostavax - N./A.  Pneumovax - never.  Seasonal Flu - N./A. She declines.                            ROS:  GENERAL: Denies fever, chills, fatigue. Appetite normal. Weight 156.4 kg (344 lb 12.8 oz) at August 28, 2017 visit. Exercises at gym some in her attempt to lose weight. Monitors diet as she stopped eating fried foods, sweets, breads, sodas.  SKIN: Denies rashes, itching, changes in mole, color or texture of skin or easy bruising.  HEAD:  Denies headaches or recent head  "trauma.  EYES: Denies change in vision, pain, diplopia, redness or discharge.  EARS: Denies ear pain, discharge, vertigo or decreased hearing.  NOSE: Denies loss of smell, epistaxis or rhinitis.  MOUTH & THROAT: Denies hoarseness or change in voice. Denies excessive gum bleeding or mouth sores.  Denies sore throat.  NODES: Denies swollen glands.  CHEST: Denies LEAL, wheezing, cough, or sputum production.  CARDIOVASCULAR: Denies chest pain, PND, orthopnea or reduced exercise tolerance.  Denies palpitations. Saw cardiologist Dr. Mancini on Vanessa 15, 2018 for hypertension with cardiac work up and advised to increase HCTZ to 25 mg daily; however, reports not yet changed the dose. Reports home blood pressure levels range 120'2/80's.  ABDOMEN: Denies diarrhea, constipation, nausea, vomiting, abdominal pain, or blood in stool.  URINARY: Denies flank pain, dysuria or hematuria.  GENITOURINARY: Denies flank pain, dysuria, frequency or hematuria. No change in menses. Does not perform monthly breast self examination.  ENDOCRINE: Currently taking cholesterol-lowering medication and vitamin D 50K units 2 times per week for some time. Reports not takes Metformin for pre-diabetes due to GI effects (from years ago) but now states willing to re-try it.  HEME/LYMPH: Denies bleeding problems.  PERIPHERAL VASCULAR:Denies claudication or cyanosis.  MUSCULOSKELETAL: Denies joint stiffness, pain or swelling. Denies edema.  NEUROLOGIC: Denies history of seizures, tremors, paralysis, alteration of gait or coordination.  PSYCHIATRIC: Denies mood swings, depression, anxiety, homicidal or suicidal thoughts. Reports improved sleeping issues.    PE:   VS: BP (!) 162/104 Comment: No med in several days per patient  Pulse 74   Temp 98.8 °F (37.1 °C)   Ht 5' 6" (1.676 m)   Wt (!) 150.5 kg (331 lb 12.7 oz)   LMP 07/13/2018 Comment: irregular but stable  SpO2 100%   BMI 53.55 kg/m²   APPEARANCE: Well nourished, well developed female, obese and " pleasant, alert and oriented in no acute distress.   HEAD: Nontender. Full range of motion.  EYES: PERRL, conjunctiva pink, lids no edema.  EARS: External canal patent, no swelling or redness. TM's shiny and clear.  NOSE: Mucosa and turbinates pink, not swollen. No discharge. Nontender sinuses.  THROAT: No pharyngeal erythema or exudate. No stridor.   NECK: Supple, no mass, non tender, no thyroid enlargement.  NODES: No cervical, axillary and inguinal lymph node enlargement.  CHEST: Normal respiratory effort. Lungs clear to auscultation.  CARDIOVASCULAR: Normal S1, S2. No rubs, murmurs or gallops. PMI not displaced. No carotid bruit. Pedal pulses palpable bilaterally. No edema.  ABDOMEN: Bowel sounds present. Not distended. Soft. No tenderness, masses or organomegaly.  BREAST EXAM: Symmetrical, no external lesions, no discharge, no masses palpated.  PELVIC EXAM:  Not examined as patient declines.   RECTAL EXAM: Not examined as patient declines.  MUSCULOSKELETAL: No joint deformities or stiffness. She is ambulatory without problems  SKIN: No rashes or suspicious lesions, normal color and turgor.  NEUROLOGIC: Cranial Nerves: II-XII grossly intact. DTR's: Knees, Ankles 2+ and equal bilaterally. Gait & Posture: Normal gait and fine motion.  PSYCHIATRIC: Patient alert, oriented x 3. Mood/Affect normal without acute anxiety or depression noted. Judgment/insight good as she makes appropriate decisions on today's examination.    ASSESSMENT:    ICD-10-CM ICD-9-CM    1. Annual physical exam Z00.00 V70.0 CBC auto differential      TSH      Comprehensive metabolic panel      Lipid panel      Urinalysis      Insulin, random      Hemoglobin A1c      Vitamin D   2. Hypertension, unspecified type I10 401.9 olmesartan-hydrochlorothiazide (BENICAR HCT) 40-25 mg per tablet   3. Hyperlipidemia, unspecified hyperlipidemia type E78.5 272.4 simvastatin (ZOCOR) 20 MG tablet   4. Pre-diabetes R73.03 790.29 metFORMIN (GLUCOPHAGE) 500 MG  tablet   5. Insulin resistance E88.81 277.7    6. Vitamin D deficiency disease E55.9 268.9 ergocalciferol (ERGOCALCIFEROL) 50,000 unit Cap   7. PVD (peripheral vascular disease) I73.9 443.9    8. Morbid obesity with BMI of 50.0-59.9, adult E66.01 278.01     Z68.43 V85.43    9. Visit for screening mammogram Z12.31 V76.12 Mammo Digital Screening Bilateral With CAD     PLAN:  1. Age-appropriate counseling-appropriate low-sodium, low-cholesterol, low carbohydrate diet and exercise daily, monthly breast self exam, annual wellness examination.   2. Patient advised to call for results.  3. Continue current medications.  4. Prescription refills as noted above.  5. Restart Metformin 500 mg twice daily with meal, #180, 0 refill; medication precautions discussed with patient.  6. Keep follow up with specialists.  7. Flu shot this fall.  8. Follow-up in about 3 months (around 10/23/2018) for pre-diabetes and hypertension follow up.

## 2018-07-26 LAB
ESTIMATED AVG GLUCOSE: 111 MG/DL
HBA1C MFR BLD HPLC: 5.5 %

## 2018-08-03 ENCOUNTER — TELEPHONE (OUTPATIENT)
Dept: PULMONOLOGY | Facility: CLINIC | Age: 48
End: 2018-08-03

## 2018-08-03 DIAGNOSIS — G47.30 SLEEP DISORDER BREATHING: Primary | ICD-10-CM

## 2018-08-04 ENCOUNTER — PATIENT MESSAGE (OUTPATIENT)
Dept: FAMILY MEDICINE | Facility: CLINIC | Age: 48
End: 2018-08-04

## 2018-08-06 ENCOUNTER — TELEPHONE (OUTPATIENT)
Dept: FAMILY MEDICINE | Facility: CLINIC | Age: 48
End: 2018-08-06

## 2018-08-06 DIAGNOSIS — R31.9 HEMATURIA, UNSPECIFIED TYPE: Primary | ICD-10-CM

## 2018-08-06 RX ORDER — SULFAMETHOXAZOLE AND TRIMETHOPRIM 800; 160 MG/1; MG/1
1 TABLET ORAL 2 TIMES DAILY
Qty: 10 TABLET | Refills: 0 | Status: SHIPPED | OUTPATIENT
Start: 2018-08-06 | End: 2018-08-11

## 2018-08-06 NOTE — TELEPHONE ENCOUNTER
Ok. Will need treatment with antibiotic, then f/u for lab only in 2 weeks for repeat urinalysis and call for result. Thanks.

## 2018-08-07 ENCOUNTER — PATIENT MESSAGE (OUTPATIENT)
Dept: FAMILY MEDICINE | Facility: CLINIC | Age: 48
End: 2018-08-07

## 2018-08-14 ENCOUNTER — HOSPITAL ENCOUNTER (OUTPATIENT)
Dept: RADIOLOGY | Facility: HOSPITAL | Age: 48
Discharge: HOME | End: 2018-08-14
Attending: FAMILY MEDICINE
Payer: COMMERCIAL

## 2018-08-14 VITALS — HEIGHT: 66 IN | BODY MASS INDEX: 47.09 KG/M2 | WEIGHT: 293 LBS

## 2018-08-14 DIAGNOSIS — Z12.31 VISIT FOR SCREENING MAMMOGRAM: ICD-10-CM

## 2018-08-14 PROCEDURE — 77067 SCR MAMMO BI INCL CAD: CPT | Mod: 26,,, | Performed by: RADIOLOGY

## 2018-08-14 PROCEDURE — 77067 SCR MAMMO BI INCL CAD: CPT | Mod: TC

## 2018-08-21 ENCOUNTER — LAB VISIT (OUTPATIENT)
Dept: LAB | Facility: HOSPITAL | Age: 48
End: 2018-08-21
Attending: FAMILY MEDICINE
Payer: COMMERCIAL

## 2018-08-21 DIAGNOSIS — R31.9 HEMATURIA, UNSPECIFIED TYPE: ICD-10-CM

## 2018-08-21 LAB
BILIRUB UR QL STRIP: NEGATIVE
CLARITY UR REFRACT.AUTO: CLEAR
COLOR UR AUTO: YELLOW
GLUCOSE UR QL STRIP: NEGATIVE
HGB UR QL STRIP: ABNORMAL
KETONES UR QL STRIP: NEGATIVE
LEUKOCYTE ESTERASE UR QL STRIP: ABNORMAL
MICROSCOPIC COMMENT: ABNORMAL
NITRITE UR QL STRIP: NEGATIVE
PH UR STRIP: 5 [PH] (ref 5–8)
PROT UR QL STRIP: NEGATIVE
RBC #/AREA URNS AUTO: 9 /HPF (ref 0–4)
SP GR UR STRIP: 1.02 (ref 1–1.03)
SQUAMOUS #/AREA URNS AUTO: 1 /HPF
URN SPEC COLLECT METH UR: ABNORMAL
UROBILINOGEN UR STRIP-ACNC: NEGATIVE EU/DL
WBC #/AREA URNS AUTO: 5 /HPF (ref 0–5)

## 2018-08-21 PROCEDURE — 81001 URINALYSIS AUTO W/SCOPE: CPT

## 2018-08-29 ENCOUNTER — TELEPHONE (OUTPATIENT)
Dept: PULMONOLOGY | Facility: CLINIC | Age: 48
End: 2018-08-29

## 2018-08-31 ENCOUNTER — OFFICE VISIT (OUTPATIENT)
Dept: PULMONOLOGY | Facility: CLINIC | Age: 48
End: 2018-08-31
Payer: COMMERCIAL

## 2018-08-31 VITALS
OXYGEN SATURATION: 99 % | RESPIRATION RATE: 18 BRPM | BODY MASS INDEX: 47.09 KG/M2 | WEIGHT: 293 LBS | HEIGHT: 66 IN | HEART RATE: 89 BPM | DIASTOLIC BLOOD PRESSURE: 80 MMHG | SYSTOLIC BLOOD PRESSURE: 132 MMHG

## 2018-08-31 DIAGNOSIS — G47.33 OSA (OBSTRUCTIVE SLEEP APNEA): Primary | ICD-10-CM

## 2018-08-31 DIAGNOSIS — I10 HYPERTENSION, UNSPECIFIED TYPE: ICD-10-CM

## 2018-08-31 DIAGNOSIS — E78.5 HYPERLIPIDEMIA, UNSPECIFIED HYPERLIPIDEMIA TYPE: ICD-10-CM

## 2018-08-31 DIAGNOSIS — E88.819 INSULIN RESISTANCE: ICD-10-CM

## 2018-08-31 PROCEDURE — 99999 PR PBB SHADOW E&M-EST. PATIENT-LVL III: CPT | Mod: PBBFAC,,, | Performed by: INTERNAL MEDICINE

## 2018-08-31 PROCEDURE — 99204 OFFICE O/P NEW MOD 45 MIN: CPT | Mod: S$GLB,,, | Performed by: INTERNAL MEDICINE

## 2018-08-31 NOTE — PATIENT INSTRUCTIONS
Your provider has scheduled you for a sleep study.   You should be receiving a phone call from the sleep lab shortly after your study has been approved by your insurance. Please make sure you have your current phone numbers in the [facility]. If you do not hear from anyone in the next 10 business days, please call the sleep lab at [phone] to schedule your sleep study. The sleep studies are performed at [facility] seven nights a week.  When you are scheduling your sleep study, they will also make you a follow up appointment with your provider. This follow up appointment will be 10-14 days after your sleep study to review the results. If it is noted that you do have sleep apnea on your initial sleep study, you may receive a call back for a second night study with the CPAP before you come back to the office.

## 2018-08-31 NOTE — PROGRESS NOTES
[facility]  History & Physical    Subjective:      Chief Complaint      Aliza Sagastume is a 47 y.o. female.  Referred to me by  7  Works for [employer]  Snoring witnessed apnea daytime fatigue feels non restored in the morning.  Frequent nocturia 2-3 times  Bedtime 10:30 p.m. wake-up time 7:30 a.m..  She gets the best sleep if she takes the Flexeril review  Comorbidities hypertension insulin resistance  No smoking no alcohol  Mother has obstructive sleep apnea father is on a CPAP.  Sleep disorder questionnaire reviewed  Principles of the evaluation and treatment for sleep apnea discussed      STOP - BANG Questionnaire:     1. Snoring : Do you snore loudly ?    Yes    2. Tired : Do you often feel tired, fatigued, or sleepy during daytime? Yes    3. Observed: Has anyone observed you stop breathing during your sleep?   Yes     4. Blood pressure : Do you have or are you being treated for high blood pressure?   Yes    5. BMI :BMI more than 35 kg/m2?   Yes    6. Age : Age over 50 yr old?   No    7. Neck circumference: Neck circumference greater than 40 cm?   No    8. Gender: Gender male?   No    High risk of SHAWNA: Yes 5 - 8        References:   STOP Questionnaire   A Tool to Screen Patients for Obstructive Sleep Apnea: TAMIKA Mustafa.C.P.C., [place] Yoo M.B.B.S., Vivian Pat M.D.,Elizabeth Andino, Ph.D., Destini Mei M.B.B.S.,_ JULIOCESAR Jacob.Sc.,_ Sharyn Valentine M.D., Jarrett Edward F.R.C.P.C.; Anesthesiology 2008; [id number] Copyright © 2008, the American Society of Anesthesiologists, Inc. [employer]        Past Medical History:   Diagnosis Date    CEDRICK positive 08/2017    High cholesterol     History of vitamin D deficiency     Hypertension     Hypothyroidism     Insulin resistance 9/12/2013    Kidney stones     Pre-diabetes     Snoring      Past Surgical History:   Procedure Laterality Date    CYSTOSCOPY W/ LASER LITHOTRIPSY       "x 2    DILATION AND CURETTAGE OF UTERUS      x1 for abnormal bleeding     Family History   Problem Relation Age of Onset    Hypertension Mother     Heart disease Father 69        MI    Hypertension Father     Diabetes Father     Cancer Father         prostate cancer    Hypertension Sister     Cancer Paternal Aunt         Brain cancer    Cancer Paternal Uncle         Pancreas cancer    Hypertension Maternal Grandmother     Hypertension Maternal Grandfather     Stroke Maternal Grandfather 86    Hypertension Paternal Grandmother     Hypertension Paternal Grandfather     No Known Problems Daughter     No Known Problems Daughter     Cancer Cousin         Breast cancer    Breast cancer Paternal Cousin      Social History     Tobacco Use    Smoking status: Never Smoker    Smokeless tobacco: Never Used   Substance Use Topics    Alcohol use: Yes     Alcohol/week: 0.0 oz     Comment: occasionally    Drug use: No         (Not in a hospital admission)  Review of patient's allergies indicates:  No Known Allergies     Review of Systems   Constitutional: Positive for malaise/fatigue.   HENT: Negative.    Eyes: Negative.    Respiratory:        Snoring, apnea, nocturia   Cardiovascular: Negative.    Gastrointestinal: Negative.    Genitourinary: Negative.         Nocturia   Skin: Negative.    Neurological: Negative.    Endo/Heme/Allergies: Negative.    Psychiatric/Behavioral: Negative.        Objective:      Vital Signs (Most Recent)  Pulse: 89 (08/31/18 0853)  Resp: 18 (08/31/18 0853)  BP: 132/80 (08/31/18 0853)  SpO2: 99 % (08/31/18 0853)    Vital Signs Range (Last 24H):  Vitals:    08/31/18 0853   BP: 132/80   Pulse: 89   Resp: 18   SpO2: 99%   Weight: (!) 151.9 kg (334 lb 14.1 oz)   Height: 5' 6" (1.676 m)         Physical Exam   Constitutional: She is oriented to person, place, and time. She appears well-developed and well-nourished. No distress.   HENT:   Head: Normocephalic and atraumatic.   Nose: Nose " "normal.   Mouth/Throat: Oropharynx is clear and moist.   Mallampati score 3   Eyes: Conjunctivae and EOM are normal. Pupils are equal, round, and reactive to light.   Neck: Normal range of motion. Neck supple. No thyromegaly present.   Neck 15.5"   Cardiovascular: Normal rate, regular rhythm and normal heart sounds.   No murmur heard.  Pulmonary/Chest: Effort normal and breath sounds normal. No respiratory distress.   Abdominal: Soft. Bowel sounds are normal.   Musculoskeletal: Normal range of motion. She exhibits no edema.   Neurological: She is alert and oriented to person, place, and time.   Skin: Skin is warm and dry. Capillary refill takes 2 to 3 seconds. She is not diaphoretic.   Psychiatric: She has a normal mood and affect.   Nursing note and vitals reviewed.      Data Review:    CBC:   Lab Results   Component Value Date    WBC 5.98 07/25/2018    RBC 5.24 07/25/2018    HGB 13.6 07/25/2018    HCT 44.4 07/25/2018     07/25/2018     BMP:   Lab Results   Component Value Date    GLU 99 07/25/2018     07/25/2018    K 3.9 07/25/2018     07/25/2018    CO2 23 07/25/2018    BUN 20 07/25/2018    CREATININE 0.9 07/25/2018    CALCIUM 8.9 07/25/2018     Radiology review:          Assessment:       Problem List Items Addressed This Visit     HTN (hypertension)    Relevant Orders    Polysomnogram (CPAP will be added if patient meets diagnostic criteria.)    Hyperlipidemia    Relevant Orders    Polysomnogram (CPAP will be added if patient meets diagnostic criteria.)    Insulin resistance    BMI 50.0-59.9, adult     General weight loss/lifestyle modification strategies discussed (elicit support from others; identify saboteurs; non-food rewards).  Diet interventions: low calorie (1000 kCal/d) deficit diet           Relevant Orders    Polysomnogram (CPAP will be added if patient meets diagnostic criteria.)      Other Visit Diagnoses     SHAWNA (obstructive sleep apnea)    -  Primary    Relevant Orders    " Polysomnogram (CPAP will be added if patient meets diagnostic criteria.)          Plan:      Will see After PSG    Follow-up for Follow up Sleep Clinic after test PSG/HSAT/CPAP.    This note was prepared using voice recognition system and is likely to have sound alike errors that may have been overlooked even after proof reading.  Please call me with any questions    Discussed diagnosis, its evaluation, treatment and usual course. All questions answered.    Thank you for the courtesy of participating in the care of this patient    Yonas Raymond MD

## 2018-10-15 ENCOUNTER — PATIENT OUTREACH (OUTPATIENT)
Dept: ADMINISTRATIVE | Facility: HOSPITAL | Age: 48
End: 2018-10-15

## 2018-11-03 ENCOUNTER — HOSPITAL ENCOUNTER (OUTPATIENT)
Dept: SLEEP MEDICINE | Facility: HOSPITAL | Age: 48
Discharge: HOME | End: 2018-11-03
Attending: INTERNAL MEDICINE
Payer: COMMERCIAL

## 2018-11-03 DIAGNOSIS — I10 HYPERTENSION, UNSPECIFIED TYPE: ICD-10-CM

## 2018-11-03 DIAGNOSIS — G47.33 OSA (OBSTRUCTIVE SLEEP APNEA): ICD-10-CM

## 2018-11-03 DIAGNOSIS — E78.5 HYPERLIPIDEMIA, UNSPECIFIED HYPERLIPIDEMIA TYPE: ICD-10-CM

## 2018-11-03 PROCEDURE — 95810 POLYSOM 6/> YRS 4/> PARAM: CPT | Mod: 26,,, | Performed by: INTERNAL MEDICINE

## 2018-11-03 PROCEDURE — 95810 POLYSOM 6/> YRS 4/> PARAM: CPT

## 2018-11-06 ENCOUNTER — PATIENT MESSAGE (OUTPATIENT)
Dept: SLEEP MEDICINE | Facility: HOSPITAL | Age: 48
End: 2018-11-06

## 2018-11-07 NOTE — PROCEDURES
Respiratory Parameters:  There were a total of 105 respiratory disturbances recorded; 28 apneas (28 obstructive, 0 mixed, 0  central), 77 hypopneas and 0 RERAs.  The apnea/hypopnea index (AHI) was 17.5 events/hour and the RDI was 17.5 events/hour.  Total 105 events  The central sleep apnea index was 0.0 events/hour.  The REM AHI was 54.4/h and NREM AHI was 7.8/h.  The REM RDI was 54.4/h and NREM RDI was 7.8/h.  The supine AHI was 40.0/h, and the non supine AHI was 16.97/h; supine during 2.08% of sleep. The  supine RDI was 40.0/h, and the non supine RDI was 16.97/h. Respiratory disturbances were associated  with oxygen desaturation down to a en of 0.00% during sleep. The mean oxygen saturation during the  study was 94.34%.  The cumulative time under 88% oxygen saturation was 5.0 minutes.  Leg Movement Data:  The periodic limb movement index was 0.00/hour, not significant (<=5/hour), with an associated arousal  index of 0.0/hour.  Cardiac Data:  The underlying cardiac rhythm was most consistent with sinus rhythm. Mean heart rate during sleep was  77.76 bpm. Additional rhythm abnormalities include None.  Impressions:  l Sleep architecture revealed a reduced sleep efficiency, long primary sleep latency, long REM  latency, and normal slow wave latency.  l Poor sleep efficiency: 66%.  l Moderate obstructive sleep apnea syndrome (SHAWNA). Severity underestimated due to 65.7% of  sleep in prone position.  l No significant periodic leg movement during sleep.  l Severe oxygen desaturations during sleep. SpO2 was below 88% for 5.0minutes.  l Moderate snoring  Diagnosis:  l Moderate Obstructive Sleep Apnea (G47.33)  l Nocturnal Hypoxemia (G47.36)  Recommendations:  l Therapeutic CPAP titration to determine optimal pressure required to alleviate sleep disordered  breathing.  l Positional therapy avoiding supine position during sleep.  l Avoid alcohol, sedatives and other CNS depressants that may worsen sleep apnea and  "disrupt  normal sleep architecture.  l Sleep hygiene should be reviewed to assess factors that may improve sleep quality.  l Weight management and regular exercise should be initiated or continued.      See imported Sleep Study result in "Chart Review" under the   "Media tab".      (This Sleep Study was interpreted by a Board Certified Sleep   Specialist who conducted an epoch-by-epoch review of the entire   raw data recording.)     (The indication for this sleep study was reviewed and deemed   appropriate by [employer] Practice Parameters or other reasons by a   Board Certified Sleep Specialist.)    Yonas Raymond MD      "

## 2018-12-11 ENCOUNTER — OFFICE VISIT (OUTPATIENT)
Dept: SLEEP MEDICINE | Facility: CLINIC | Age: 48
End: 2018-12-11
Payer: COMMERCIAL

## 2018-12-11 VITALS
SYSTOLIC BLOOD PRESSURE: 150 MMHG | WEIGHT: 293 LBS | DIASTOLIC BLOOD PRESSURE: 96 MMHG | HEART RATE: 92 BPM | RESPIRATION RATE: 18 BRPM | OXYGEN SATURATION: 98 % | HEIGHT: 66 IN | BODY MASS INDEX: 47.09 KG/M2

## 2018-12-11 DIAGNOSIS — I10 HYPERTENSION, UNSPECIFIED TYPE: ICD-10-CM

## 2018-12-11 DIAGNOSIS — G47.33 OSA (OBSTRUCTIVE SLEEP APNEA): Primary | ICD-10-CM

## 2018-12-11 PROCEDURE — 99213 OFFICE O/P EST LOW 20 MIN: CPT | Mod: S$GLB,,, | Performed by: NURSE PRACTITIONER

## 2018-12-11 PROCEDURE — 99999 PR PBB SHADOW E&M-EST. PATIENT-LVL III: CPT | Mod: PBBFAC,,, | Performed by: NURSE PRACTITIONER

## 2018-12-11 NOTE — PROGRESS NOTES
"Subjective:      Patient ID: Aliza Sagastume is a 48 y.o. female.    Chief Complaint: Sleep Apnea    Patient presents to the office today for evaluation of sleep apnea.  Recent polysomnogram.  She has snoring and apnea events.  Frequent nocturnal urination.  She does take a diuretic before bedtime. [place] Sleepiness scale score: 8    Patient Active Problem List:     HTN (hypertension)     Hyperlipidemia     Vitamin D deficiency disease     Insulin resistance     BMI 50.0-59.9, adult     Allergic rhinitis, cause unspecified     Urolithiasis     Goiter     Snores     PVD (peripheral vascular disease)     SHAWNA (obstructive sleep apnea)                   BP (!) 150/96 (BP Location: Right arm, Patient Position: Sitting)   Pulse 92   Resp 18   Ht 5' 6" (1.676 m)   Wt (!) 158.5 kg (349 lb 6.9 oz)   SpO2 98%   BMI 56.40 kg/m²   Body mass index is 56.4 kg/m².    Review of Systems   Respiratory: Positive for snoring.    Genitourinary:        Nocturia   Psychiatric/Behavioral: Positive for sleep disturbance.   All other systems reviewed and are negative.    Objective:      Physical Exam   Constitutional: She is oriented to person, place, and time. She appears well-developed and well-nourished.   Obese     HENT:   Head: Normocephalic and atraumatic.   Mallampati Score: III   Neck: Normal range of motion. Neck supple.   Cardiovascular: Normal rate and regular rhythm.   Pulmonary/Chest: Effort normal and breath sounds normal.   Abdominal: Soft.   Musculoskeletal: Normal range of motion. She exhibits no edema.   Neurological: She is alert and oriented to person, place, and time.   Skin: Skin is warm and dry.   Psychiatric: She has a normal mood and affect.     Personal Diagnostic Review  Polysomnogram show sleep apnea with AHI 17/hr.           Assessment:       1. SHAWNA (obstructive sleep apnea)    2. Hypertension, unspecified type    3. BMI 50.0-59.9, adult        Outpatient Encounter Medications as of 12/11/2018 "   Medication Sig Dispense Refill    cyclobenzaprine (FLEXERIL) 10 MG tablet TAKE 1 TABLET(10 MG) BY MOUTH EVERY EVENING AS NEEDED FOR MUSCLE SPASMS 30 tablet 0    ergocalciferol (ERGOCALCIFEROL) 50,000 unit Cap TAKE 1 CAPSULE BY MOUTH TWICE WEEKLY(ON MONDAY AND FRIDAY) 12 capsule 1    ibuprofen (ADVIL,MOTRIN) 800 MG tablet TAKE 1 TABLET BY MOUTH TWICE DAILY WITH MEALS 30 tablet 0    metFORMIN (GLUCOPHAGE) 500 MG tablet Take 1 tablet (500 mg total) by mouth 2 (two) times daily with meals. 180 tablet 0    multivitamin capsule Take 1 capsule by mouth once daily.      olmesartan-hydrochlorothiazide (BENICAR HCT) 40-25 mg per tablet Take 1 tablet by mouth once daily. 90 tablet 1    simvastatin (ZOCOR) 20 MG tablet Take 1 tablet (20 mg total) by mouth every evening. TAKE 1 TABLET(20 MG) BY MOUTH EVERY EVENING 90 tablet 1     No facility-administered encounter medications on file as of 12/11/2018.      Orders Placed This Encounter   Procedures    CPAP FOR HOME USE     Order Specific Question:   Type:     Answer:   Auto CPAP     Order Specific Question:   Auto CPAP pressure setting range (cmH20):     Answer:   6-16     Order Specific Question:   Length of need (1-99 months):     Answer:   99     Order Specific Question:   Humidification:     Answer:   Heated     Order Specific Question:   Type of mask:     Answer:   Nasal     Order Specific Question:   Headgear?     Answer:   Yes     Order Specific Question:   Tubing?     Answer:   Yes     Order Specific Question:   Humidifier chamber?     Answer:   Yes     Order Specific Question:   Chin strap?     Answer:   Yes     Order Specific Question:   Filters?     Answer:   Yes    CPAP/BIPAP SUPPLIES     Order Specific Question:   Type of mask:     Answer:   Nasal     Order Specific Question:   Headgear?     Answer:   Yes     Order Specific Question:   Tubing?     Answer:   Yes     Order Specific Question:   Humidifier chamber?     Answer:   Yes     Order Specific  Question:   Chin strap?     Answer:   Yes     Order Specific Question:   Filters?     Answer:   Yes     Order Specific Question:   Length of need (1-99 months):     Answer:   99    CPAP/BIPAP SUPPLIES     Order Specific Question:   Type of mask:     Answer:   FFM     Order Specific Question:   Headgear?     Answer:   Yes     Order Specific Question:   Tubing?     Answer:   Yes     Order Specific Question:   Humidifier chamber?     Answer:   Yes     Order Specific Question:   Chin strap?     Answer:   Yes     Order Specific Question:   Filters?     Answer:   Yes     Order Specific Question:   Length of need (1-99 months):     Answer:   99     Plan:      Autopap to start therapy. Close review and follow up.   Follow up blood pressure this week.  Weight loss and exercise to improve overall health.

## 2018-12-17 ENCOUNTER — HOSPITAL ENCOUNTER (EMERGENCY)
Facility: HOSPITAL | Age: 48
Discharge: HOME | End: 2018-12-18
Attending: EMERGENCY MEDICINE
Payer: COMMERCIAL

## 2018-12-17 DIAGNOSIS — R00.2 PALPITATIONS: Primary | ICD-10-CM

## 2018-12-17 DIAGNOSIS — R52 PAIN: ICD-10-CM

## 2018-12-17 DIAGNOSIS — R07.9 CHEST PAIN: ICD-10-CM

## 2018-12-17 DIAGNOSIS — R25.2 MUSCLE CRAMP: ICD-10-CM

## 2018-12-17 PROCEDURE — 99285 EMERGENCY DEPT VISIT HI MDM: CPT | Mod: 25

## 2018-12-17 PROCEDURE — 93010 ELECTROCARDIOGRAM REPORT: CPT | Mod: ,,, | Performed by: INTERNAL MEDICINE

## 2018-12-17 PROCEDURE — 93005 ELECTROCARDIOGRAM TRACING: CPT

## 2018-12-18 VITALS
HEART RATE: 100 BPM | DIASTOLIC BLOOD PRESSURE: 73 MMHG | WEIGHT: 293 LBS | SYSTOLIC BLOOD PRESSURE: 143 MMHG | RESPIRATION RATE: 18 BRPM | TEMPERATURE: 98 F | OXYGEN SATURATION: 98 % | BODY MASS INDEX: 47.09 KG/M2 | HEIGHT: 66 IN

## 2018-12-18 LAB
ALBUMIN SERPL BCP-MCNC: 3.7 G/DL
ALP SERPL-CCNC: 76 U/L
ALT SERPL W/O P-5'-P-CCNC: 14 U/L
ANION GAP SERPL CALC-SCNC: 13 MMOL/L
AST SERPL-CCNC: 32 U/L
BASOPHILS # BLD AUTO: 0.01 K/UL
BASOPHILS NFR BLD: 0.2 %
BILIRUB SERPL-MCNC: 0.3 MG/DL
BUN SERPL-MCNC: 32 MG/DL
CALCIUM SERPL-MCNC: 9.7 MG/DL
CHLORIDE SERPL-SCNC: 101 MMOL/L
CO2 SERPL-SCNC: 24 MMOL/L
CREAT SERPL-MCNC: 1.4 MG/DL
D DIMER PPP IA.FEU-MCNC: 0.82 MG/L FEU
DIFFERENTIAL METHOD: ABNORMAL
EOSINOPHIL # BLD AUTO: 0.1 K/UL
EOSINOPHIL NFR BLD: 1.3 %
ERYTHROCYTE [DISTWIDTH] IN BLOOD BY AUTOMATED COUNT: 14.3 %
EST. GFR  (AFRICAN AMERICAN): 51 ML/MIN/1.73 M^2
EST. GFR  (NON AFRICAN AMERICAN): 44 ML/MIN/1.73 M^2
GLUCOSE SERPL-MCNC: 111 MG/DL
HCT VFR BLD AUTO: 42.6 %
HGB BLD-MCNC: 13.4 G/DL
LYMPHOCYTES # BLD AUTO: 2.1 K/UL
LYMPHOCYTES NFR BLD: 32.7 %
MAGNESIUM SERPL-MCNC: 2 MG/DL
MCH RBC QN AUTO: 26.2 PG
MCHC RBC AUTO-ENTMCNC: 31.5 G/DL
MCV RBC AUTO: 83 FL
MONOCYTES # BLD AUTO: 0.6 K/UL
MONOCYTES NFR BLD: 9.1 %
NEUTROPHILS # BLD AUTO: 3.6 K/UL
NEUTROPHILS NFR BLD: 56.7 %
PHOSPHATE SERPL-MCNC: 4.1 MG/DL
PLATELET # BLD AUTO: 200 K/UL
PMV BLD AUTO: 11.6 FL
POTASSIUM SERPL-SCNC: 3.6 MMOL/L
PROT SERPL-MCNC: 7.9 G/DL
RBC # BLD AUTO: 5.12 M/UL
SODIUM SERPL-SCNC: 138 MMOL/L
T4 FREE SERPL-MCNC: 0.86 NG/DL
TSH SERPL DL<=0.005 MIU/L-ACNC: 4.35 UIU/ML
WBC # BLD AUTO: 6.26 K/UL

## 2018-12-18 PROCEDURE — 84100 ASSAY OF PHOSPHORUS: CPT

## 2018-12-18 PROCEDURE — 80053 COMPREHEN METABOLIC PANEL: CPT

## 2018-12-18 PROCEDURE — 84443 ASSAY THYROID STIM HORMONE: CPT

## 2018-12-18 PROCEDURE — 84439 ASSAY OF FREE THYROXINE: CPT

## 2018-12-18 PROCEDURE — 85025 COMPLETE CBC W/AUTO DIFF WBC: CPT

## 2018-12-18 PROCEDURE — 83735 ASSAY OF MAGNESIUM: CPT

## 2018-12-18 PROCEDURE — 85379 FIBRIN DEGRADATION QUANT: CPT

## 2018-12-18 NOTE — ED PROVIDER NOTES
"SCRIBE #1 NOTE: I, Kamari Mckeon, am scribing for, and in the presence of, Ishmael Menendez MD. I have scribed the entire chart.       History     Chief Complaint   Patient presents with    Palpitations     since 2030 tonight. Pt states reports that her chest feels "heavy" and "tight"     Review of patient's allergies indicates:  No Known Allergies      History of Present Illness     HPI    12/17/2018, 11:32 PM  History obtained from the patient      History of Present Illness: Aliza Sagastume is a 48 y.o. female patient with a PMHx of kidney stones and HTN who presents to the Emergency Department for evaluation of SOB which onset gradually today. Pt reports feeling like she is breathing heavier than normal with minimal exertion. Pt also reports an associated tightness in her chest with exertion. Pt states she presents to ED to r/o a DVT due to recently traveling. Pt reports chronic muscle spasms to her bilateral legs which she is followed for by her PCP. Symptoms are intermittent and moderate in severity. Exacerbated by exertion and relieved by nothing. Associated sxs include chest tightness. Patient denies any fever, chills, diaphoresis, N/V, dysuria, hematuria, weakness/numbness, leg swelling, and all other sxs at this time. Pt denies tx PTA. No further complaints or concerns at this time.     Arrival mode: Personal vehicle    PCP: Kayla Bradshaw MD        Past Medical History:  Past Medical History:   Diagnosis Date    CEDRICK positive 08/2017    High cholesterol     History of vitamin D deficiency     Hypertension     Hypothyroidism     Insulin resistance 9/12/2013    Kidney stones     Pre-diabetes     Snoring        Past Surgical History:  Past Surgical History:   Procedure Laterality Date    CYSTOSCOPY Left 11/19/2013    Performed [facility] OR    CYSTOSCOPY W/ LASER LITHOTRIPSY      x 2    CYSTOURETEROSCOPY, WITH RETROGRADE PYELOGRAM AND URETERAL STENT INSERTION  " 10/22/2013    Performed by [facility] at [facility] OR    DILATION AND CURETTAGE OF UTERUS      x1 for abnormal bleeding    LITHOTRIPSY, ESWL Left 11/19/2013    Performed by [facility] at [facility] OR    REMOVAL, STENT, URETER Left 11/19/2013    Performed by [facility], [facility] at [facility] OR    URETEROSCOPY  10/22/2013    Performed by [facility], [facility] at [facility] OR         Family History:  Family History   Problem Relation Age of Onset    Hypertension Mother     Heart disease Father 69        MI    Hypertension Father     Diabetes Father     Cancer Father         prostate cancer    Hypertension Sister     Cancer Paternal Aunt         Brain cancer    Cancer Paternal Uncle         Pancreas cancer    Hypertension Maternal Grandmother     Hypertension Maternal Grandfather     Stroke Maternal Grandfather 86    Hypertension Paternal Grandmother     Hypertension Paternal Grandfather     No Known Problems Daughter     No Known Problems Daughter     Cancer Cousin         Breast cancer    Breast cancer Paternal Cousin        Social History:  Social History     Tobacco Use    Smoking status: Never Smoker    Smokeless tobacco: Never Used   Substance and Sexual Activity    Alcohol use: Yes     Alcohol/week: 0.0 oz     Comment: occasionally    Drug use: No    Sexual activity: No        Review of Systems     Review of Systems   Constitutional: Negative for chills, diaphoresis and fever.        (+) Recent travel     HENT: Negative for congestion, sore throat and trouble swallowing.    Eyes: Negative for photophobia and visual disturbance.   Respiratory: Positive for chest tightness and shortness of breath. Negative for cough and wheezing.    Cardiovascular: Negative for chest pain, palpitations and leg swelling.   Gastrointestinal: Negative for abdominal pain, nausea and vomiting.   Genitourinary: Negative for dysuria, frequency, hematuria and urgency.   Musculoskeletal: Negative  "for back pain, gait problem and neck pain.        (-) Calf tenderness   Skin: Negative for rash and wound.   Neurological: Negative for dizziness, weakness, light-headedness and headaches.   Hematological: Does not bruise/bleed easily.   All other systems reviewed and are negative.     Physical Exam     Initial Vitals [12/17/18 2233]   BP Pulse Resp Temp SpO2   (!) 172/81 (!) 111 20 98 °F (36.7 °C) (!) 93 %      MAP       --          Physical Exam  Nursing Notes and Vital Signs Reviewed.  Constitutional: Patient is in no acute distress. Well-developed and well-nourished. Obese.   Head: Atraumatic. Normocephalic.  Eyes: PERRL. EOM intact. Conjunctivae are not pale. No scleral icterus.  ENT: Mucous membranes are moist. Oropharynx is clear and symmetric.    Neck: Supple. Full ROM. No lymphadenopathy.  Cardiovascular: Tachycardic. Regular rhythm. No murmurs, rubs, or gallops. Distal pulses are 2+ and symmetric.  Pulmonary/Chest: No respiratory distress. Clear to auscultation bilaterally. No wheezing or rales.  Abdominal: Soft and non-distended.  There is no tenderness.   Musculoskeletal: Moves all extremities. No obvious deformities. No edema. No calf tenderness.  Skin: Warm and dry.  Neurological:  Alert, awake, and appropriate.  Normal speech.  No acute focal neurological deficits are appreciated.  Psychiatric: Normal affect. Good eye contact. Appropriate in content.     ED Course   Procedures  ED Vital Signs:  Vitals:    12/17/18 2233 12/17/18 2243 12/18/18 0010 12/18/18 0055   BP: (!) 172/81  130/60 (!) 117/54   Pulse: (!) 111 100 89 92   Resp: 20 18 18   Temp: 98 °F (36.7 °C)      TempSrc: Oral      SpO2: (!) 93%  100% 96%   Weight: (!) 156.9 kg (345 lb 14.4 oz)      Height: 5' 6" (1.676 m)       12/18/18 0135 12/18/18 0205   BP: (!) 152/73 (!) 143/73   Pulse: 88 100   Resp: 18 18   Temp:     TempSrc:     SpO2: 97% 98%   Weight:     Height:         Abnormal Lab Results:  Labs Reviewed   CBC W/ AUTO DIFFERENTIAL - " Abnormal; Notable for the following components:       Result Value    MCH 26.2 (*)     MCHC 31.5 (*)     All other components within normal limits   COMPREHENSIVE METABOLIC PANEL - Abnormal; Notable for the following components:    Glucose 111 (*)     BUN, Bld 32 (*)     eGFR if  51 (*)     eGFR if non  44 (*)     All other components within normal limits   TSH - Abnormal; Notable for the following components:    TSH 4.349 (*)     All other components within normal limits   D DIMER, QUANTITATIVE - Abnormal; Notable for the following components:    D-Dimer 0.82 (*)     All other components within normal limits   MAGNESIUM   PHOSPHORUS   T4, FREE        All Lab Results:  Results for orders placed or performed during the hospital encounter of 12/17/18   CBC auto differential   Result Value Ref Range    WBC 6.26 3.90 - 12.70 K/uL    RBC 5.12 4.00 - 5.40 M/uL    Hemoglobin 13.4 12.0 - 16.0 g/dL    Hematocrit 42.6 37.0 - 48.5 %    MCV 83 82 - 98 fL    MCH 26.2 (L) 27.0 - 31.0 pg    MCHC 31.5 (L) 32.0 - 36.0 g/dL    RDW 14.3 11.5 - 14.5 %    Platelets 200 150 - 350 K/uL    MPV 11.6 9.2 - 12.9 fL    Gran # (ANC) 3.6 1.8 - 7.7 K/uL    Lymph # 2.1 1.0 - 4.8 K/uL    Mono # 0.6 0.3 - 1.0 K/uL    Eos # 0.1 0.0 - 0.5 K/uL    Baso # 0.01 0.00 - 0.20 K/uL    Gran% 56.7 38.0 - 73.0 %    Lymph% 32.7 18.0 - 48.0 %    Mono% 9.1 4.0 - 15.0 %    Eosinophil% 1.3 0.0 - 8.0 %    Basophil% 0.2 0.0 - 1.9 %    Differential Method Automated    Comprehensive metabolic panel   Result Value Ref Range    Sodium 138 136 - 145 mmol/L    Potassium 3.6 3.5 - 5.1 mmol/L    Chloride 101 95 - 110 mmol/L    CO2 24 23 - 29 mmol/L    Glucose 111 (H) 70 - 110 mg/dL    BUN, Bld 32 (H) 6 - 20 mg/dL    Creatinine 1.4 0.5 - 1.4 mg/dL    Calcium 9.7 8.7 - 10.5 mg/dL    Total Protein 7.9 6.0 - 8.4 g/dL    Albumin 3.7 3.5 - 5.2 g/dL    Total Bilirubin 0.3 0.1 - 1.0 mg/dL    Alkaline Phosphatase 76 55 - 135 U/L    AST 32 10 - 40 U/L     ALT 14 10 - 44 U/L    Anion Gap 13 8 - 16 mmol/L    eGFR if African American 51 (A) >60 mL/min/1.73 m^2    eGFR if non African American 44 (A) >60 mL/min/1.73 m^2   Magnesium   Result Value Ref Range    Magnesium 2.0 1.6 - 2.6 mg/dL   Phosphorus   Result Value Ref Range    Phosphorus 4.1 2.7 - 4.5 mg/dL   TSH   Result Value Ref Range    TSH 4.349 (H) 0.400 - 4.000 uIU/mL   D dimer, quantitative   Result Value Ref Range    D-Dimer 0.82 (H) <0.50 mg/L FEU   T4, free   Result Value Ref Range    Free T4 0.86 0.71 - 1.51 ng/dL         Imaging Results:  Interpreted by virtual imaging.  Study: X-Ray Chest 1 View  Findings: Per virtual imaging, NAF      The EKG was ordered, reviewed, and independently interpreted by the ED provider.  Interpretation time: 22:43  Rate: 100 BPM  Rhythm: normal sinus rhythm  Interpretation: Normal axis. No ST abnormalities. No STEMI.           The Emergency Provider reviewed the vital signs and test results, which are outlined above.     ED Discussion     2:00 AM: Negative US as per US tech at bedside.     2:05 AM: Reassessed pt at this time. Pt is awake, alert, and in NAD. Pt states her condition has improved at this time. Discussed with pt all pertinent ED information and results. Discussed pt dx and plan of tx. Gave pt all f/u and return to the ED instructions. All questions and concerns were addressed at this time. Pt expresses understanding of information and instructions, and is comfortable with plan to discharge. Pt is stable for discharge.    I discussed with patient and/or family/caretaker that evaluation in the ED does not suggest any emergent or life threatening medical conditions requiring immediate intervention beyond what was provided in the ED, and I believe patient is safe for discharge.  Regardless, an unremarkable evaluation in the ED does not preclude the development or presence of a serious of life threatening condition. As such, patient was instructed to return immediately  for any worsening or change in current symptoms.      ED Medication(s):  Medications - No data to display    Follow-up Information     Kayla Bradshaw MD In 2 days.    Specialty:  Family Medicine  Contact information:  [address]  [place] [address] [phone]             [facility] - .    Specialty:  Emergency Medicine  Why:  If symptoms worsen  Contact information:  [address]  [facility] [address] [phone]                       Medical Decision Making:   Clinical Tests:   Lab Tests: Ordered and Reviewed  Radiological Study: Ordered and Reviewed  Medical Tests: Ordered and Reviewed             Scribe Attestation:   Scribe #1: I performed the above scribed service and the documentation accurately describes the services I performed. I attest to the accuracy of the note.     Attending:   Physician Attestation Statement for Scribe #1: I, Ishmael Menendez MD, personally performed the services described in this documentation, as scribed by Kamari Mckeon, in my presence, and it is both accurate and complete.           Clinical Impression       ICD-10-CM ICD-9-CM   1. Palpitations R00.2 785.1   2. Chest pain R07.9 786.50   3. Pain R52 780.96   4. Muscle cramp R25.2 729.82       Disposition:   Disposition: Discharged  Condition: Stable         Ishmael Menendez MD  12/21/18 0842

## 2019-02-25 ENCOUNTER — TELEPHONE (OUTPATIENT)
Dept: FAMILY MEDICINE | Facility: CLINIC | Age: 49
End: 2019-02-25

## 2019-02-25 NOTE — TELEPHONE ENCOUNTER
----- Message from Kayla Bradshaw MD sent at 2/24/2019  2:12 PM CST -----  Advise pt due for b/p f/u appt w/me.

## 2019-02-27 ENCOUNTER — OFFICE VISIT (OUTPATIENT)
Dept: SLEEP MEDICINE | Facility: CLINIC | Age: 49
End: 2019-02-27
Payer: COMMERCIAL

## 2019-02-27 VITALS
SYSTOLIC BLOOD PRESSURE: 140 MMHG | HEART RATE: 90 BPM | OXYGEN SATURATION: 97 % | RESPIRATION RATE: 18 BRPM | BODY MASS INDEX: 47.09 KG/M2 | HEIGHT: 66 IN | DIASTOLIC BLOOD PRESSURE: 86 MMHG | WEIGHT: 293 LBS

## 2019-02-27 DIAGNOSIS — G47.33 OSA (OBSTRUCTIVE SLEEP APNEA): Primary | ICD-10-CM

## 2019-02-27 PROCEDURE — 99999 PR PBB SHADOW E&M-EST. PATIENT-LVL IV: ICD-10-PCS | Mod: PBBFAC,,, | Performed by: NURSE PRACTITIONER

## 2019-02-27 PROCEDURE — 99213 PR OFFICE/OUTPT VISIT, EST, LEVL III, 20-29 MIN: ICD-10-PCS | Mod: S$GLB,,, | Performed by: NURSE PRACTITIONER

## 2019-02-27 PROCEDURE — 99999 PR PBB SHADOW E&M-EST. PATIENT-LVL IV: CPT | Mod: PBBFAC,,, | Performed by: NURSE PRACTITIONER

## 2019-02-27 PROCEDURE — 99213 OFFICE O/P EST LOW 20 MIN: CPT | Mod: S$GLB,,, | Performed by: NURSE PRACTITIONER

## 2019-02-27 NOTE — PROGRESS NOTES
"Subjective:      Patient ID: Aliza Sagastume is a 48 y.o. female.    Chief Complaint: Sleep Apnea    HPI  Patient presents to the office today for evaluation of sleep apnea on auto Pap.  Patient states when she started auto Pap she noticed more energy right away.  She is still having issues wearing over 4 hr.  She has obtained a new mask which is helping with usage.  Frequent nocturnal urination.  She does take a diuretic before bedtime.    Patient Active Problem List   Diagnosis    HTN (hypertension)    Hyperlipidemia    Vitamin D deficiency disease    Insulin resistance    BMI 50.0-59.9, adult    Allergic rhinitis, cause unspecified    Urolithiasis    Goiter    Snores    PVD (peripheral vascular disease)    SHAWNA (obstructive sleep apnea)       BP (!) 140/86   Pulse 90   Resp 18   Ht 5' 6" (1.676 m)   Wt (!) 158.7 kg (349 lb 13.9 oz)   SpO2 97%   BMI 56.47 kg/m²   Body mass index is 56.47 kg/m².    Review of Systems   Constitutional: Negative.    HENT: Negative.    Respiratory: Negative.    Cardiovascular: Negative.    Musculoskeletal: Negative.    Gastrointestinal: Negative.    Neurological: Negative.    Psychiatric/Behavioral: Negative.      Objective:      Physical Exam   Constitutional: She is oriented to person, place, and time. She appears well-developed and well-nourished.   obese   HENT:   Head: Normocephalic and atraumatic.   Neck: Normal range of motion. Neck supple.   Cardiovascular: Normal rate and regular rhythm.   Pulmonary/Chest: Effort normal and breath sounds normal.   Musculoskeletal: Normal range of motion.   Neurological: She is alert and oriented to person, place, and time.   Skin: Skin is warm and dry.   Psychiatric: She has a normal mood and affect.     Personal Diagnostic Review  Autopap download: Patient wears on average 3 hrs and 26 minutes. Greater than 4 hrs 28 % of the time. 90% percentile pressure 11 with AHI 4 events/hr    Assessment:       1. SHAWNA (obstructive " sleep apnea)    2. BMI 50.0-59.9, adult        Outpatient Encounter Medications as of 2/27/2019   Medication Sig Dispense Refill    cyclobenzaprine (FLEXERIL) 10 MG tablet TAKE 1 TABLET(10 MG) BY MOUTH EVERY EVENING AS NEEDED FOR MUSCLE SPASMS 30 tablet 0    ergocalciferol (ERGOCALCIFEROL) 50,000 unit Cap TAKE 1 CAPSULE BY MOUTH TWICE WEEKLY(ON MONDAY AND FRIDAY) 12 capsule 1    ibuprofen (ADVIL,MOTRIN) 800 MG tablet TAKE 1 TABLET BY MOUTH TWICE DAILY WITH MEALS 30 tablet 0    metFORMIN (GLUCOPHAGE) 500 MG tablet Take 1 tablet (500 mg total) by mouth 2 (two) times daily with meals. 180 tablet 0    multivitamin capsule Take 1 capsule by mouth once daily.      olmesartan-hydrochlorothiazide (BENICAR HCT) 40-25 mg per tablet Take 1 tablet by mouth once daily. 90 tablet 1    simvastatin (ZOCOR) 20 MG tablet Take 1 tablet (20 mg total) by mouth every evening. TAKE 1 TABLET(20 MG) BY MOUTH EVERY EVENING 90 tablet 1     No facility-administered encounter medications on file as of 2/27/2019.      No orders of the defined types were placed in this encounter.    Plan:   Continue to habituate.  Follow-up before 90 day set up a date.  Patient is benefitting from use.

## 2019-03-09 ENCOUNTER — PATIENT MESSAGE (OUTPATIENT)
Dept: PULMONOLOGY | Facility: CLINIC | Age: 49
End: 2019-03-09

## 2019-03-11 DIAGNOSIS — I10 HYPERTENSION, UNSPECIFIED TYPE: ICD-10-CM

## 2019-03-11 DIAGNOSIS — E78.5 HYPERLIPIDEMIA, UNSPECIFIED HYPERLIPIDEMIA TYPE: ICD-10-CM

## 2019-03-11 DIAGNOSIS — E55.9 VITAMIN D DEFICIENCY DISEASE: ICD-10-CM

## 2019-03-11 PROBLEM — R06.83 SNORES: Status: RESOLVED | Noted: 2018-06-15 | Resolved: 2019-03-11

## 2019-03-11 RX ORDER — OLMESARTAN MEDOXOMIL AND HYDROCHLOROTHIAZIDE 40/25 40; 25 MG/1; MG/1
1 TABLET ORAL DAILY
Qty: 30 TABLET | Refills: 0 | Status: SHIPPED | OUTPATIENT
Start: 2019-03-11 | End: 2019-04-30 | Stop reason: SDUPTHER

## 2019-03-11 RX ORDER — ERGOCALCIFEROL 1.25 MG/1
CAPSULE ORAL
Qty: 12 CAPSULE | Refills: 0 | Status: SHIPPED | OUTPATIENT
Start: 2019-03-11 | End: 2019-04-30 | Stop reason: SDUPTHER

## 2019-03-11 RX ORDER — SIMVASTATIN 20 MG/1
20 TABLET, FILM COATED ORAL NIGHTLY
Qty: 30 TABLET | Refills: 0 | Status: SHIPPED | OUTPATIENT
Start: 2019-03-11 | End: 2019-04-30 | Stop reason: SDUPTHER

## 2019-03-17 DIAGNOSIS — R73.03 PRE-DIABETES: ICD-10-CM

## 2019-03-20 ENCOUNTER — PATIENT MESSAGE (OUTPATIENT)
Dept: FAMILY MEDICINE | Facility: CLINIC | Age: 49
End: 2019-03-20

## 2019-03-20 ENCOUNTER — TELEPHONE (OUTPATIENT)
Dept: FAMILY MEDICINE | Facility: CLINIC | Age: 49
End: 2019-03-20

## 2019-03-20 NOTE — TELEPHONE ENCOUNTER
----- Message from Nidia Brown sent at 3/20/2019  8:11 AM CDT -----  Contact: [phone]  Would like to consult with the nurse, patients would like the dr to fix  Her refill on her medication, so the pharmacy want keeping calling concerning her getting a refill, patients states that's she has 6 refill, left but the pharmacy want fill it not until they speak with the dr, please call back at [phone], thanks sj

## 2019-03-21 RX ORDER — METFORMIN HYDROCHLORIDE 500 MG/1
TABLET ORAL
Qty: 180 TABLET | Refills: 0 | Status: SHIPPED | OUTPATIENT
Start: 2019-03-21 | End: 2019-04-30 | Stop reason: SDUPTHER

## 2019-04-01 ENCOUNTER — OFFICE VISIT (OUTPATIENT)
Dept: PULMONOLOGY | Facility: CLINIC | Age: 49
End: 2019-04-01
Payer: COMMERCIAL

## 2019-04-01 VITALS
HEART RATE: 107 BPM | BODY MASS INDEX: 47.09 KG/M2 | DIASTOLIC BLOOD PRESSURE: 82 MMHG | OXYGEN SATURATION: 95 % | HEIGHT: 66 IN | WEIGHT: 293 LBS | SYSTOLIC BLOOD PRESSURE: 124 MMHG | RESPIRATION RATE: 18 BRPM

## 2019-04-01 DIAGNOSIS — G47.33 OSA (OBSTRUCTIVE SLEEP APNEA): ICD-10-CM

## 2019-04-01 PROCEDURE — 99213 PR OFFICE/OUTPT VISIT, EST, LEVL III, 20-29 MIN: ICD-10-PCS | Mod: S$GLB,,, | Performed by: NURSE PRACTITIONER

## 2019-04-01 PROCEDURE — 99999 PR PBB SHADOW E&M-EST. PATIENT-LVL IV: CPT | Mod: PBBFAC,,, | Performed by: NURSE PRACTITIONER

## 2019-04-01 PROCEDURE — 99213 OFFICE O/P EST LOW 20 MIN: CPT | Mod: S$GLB,,, | Performed by: NURSE PRACTITIONER

## 2019-04-01 PROCEDURE — 99999 PR PBB SHADOW E&M-EST. PATIENT-LVL IV: ICD-10-PCS | Mod: PBBFAC,,, | Performed by: NURSE PRACTITIONER

## 2019-04-01 RX ORDER — ESTRADIOL 0.1 MG/D
PATCH TRANSDERMAL
Refills: 3 | COMMUNITY
Start: 2019-03-13 | End: 2019-04-30

## 2019-04-01 NOTE — PROGRESS NOTES
"Subjective:      Patient ID: Aliza Sagastume is a 48 y.o. female.    Chief Complaint: Sleep Apnea    HPI  Presents to office for review of AutoPAP therapy. Patient states improved symptoms with use of AutoPAP. Sleeping more soundly. Waking up feeling more refreshed. Improved daytime sleepiness. Patient states she is benefiting from use of the AutoPAP. Frequent nocturnal urination.  She does take a diuretic before bedtime.She has had some weight gain since last visit.    Patient Active Problem List   Diagnosis    HTN (hypertension)    Hyperlipidemia    Vitamin D deficiency disease    Insulin resistance    BMI 50.0-59.9, adult    Allergic rhinitis, cause unspecified    Urolithiasis    Goiter    PVD (peripheral vascular disease)    SHAWNA (obstructive sleep apnea)       /82   Pulse 107   Resp 18   Ht 5' 6" (1.676 m)   Wt (!) 163.2 kg (359 lb 12.7 oz)   SpO2 95%   BMI 58.07 kg/m²   Body mass index is 58.07 kg/m².    Review of Systems   Constitutional: Positive for weight gain.   HENT: Negative.    Respiratory: Negative.    Cardiovascular: Negative.    Musculoskeletal: Negative.    Gastrointestinal: Negative.    Neurological: Negative.    Psychiatric/Behavioral: Negative.      Objective:      Physical Exam   Constitutional: She is oriented to person, place, and time. She appears well-developed and well-nourished.   HENT:   Head: Normocephalic and atraumatic.   Mallampati Score: III   Neck: Normal range of motion. Neck supple.   Cardiovascular: Normal rate and regular rhythm.   Pulmonary/Chest: Effort normal and breath sounds normal.   Abdominal: Soft. Bowel sounds are normal.   Musculoskeletal: Normal range of motion. She exhibits no edema.   Neurological: She is alert and oriented to person, place, and time.   Skin: Skin is warm and dry.   Psychiatric: She has a normal mood and affect.     Personal Diagnostic Review  Compliance Information  Aliza Sagastume  Device: DreamStation Auto CPAP " (500X110) ([id number])  3/2/2019 - 3/31/2019  YOB: 1970  Mask:  Compliance Summary  3/2/2019 - 3/31/2019 (30 days)  Days with Device Usage 29 days  Days without Device Usage 1 day  Percent Days with Device Usage 96.7%  Cumulative Usage 6 days 38 mins. 40 secs.  Maximum Usage (1 Day) 7 hrs. 11 mins. 16 secs.  Average Usage (All Days) 4 hrs. 49 mins. 17 secs.  Average Usage (Days Used) 4 hrs. 59 mins. 15 secs.  Minimum Usage (1 Day) 2 hrs. 1 mins. 5 secs.  Percent of Days with Usage >= 4 Hours 86.7%  Percent of Days with Usage < 4 Hours 13.3%  Date Range  Average AHI 5.4  Auto-CPAP Summary  Auto-CPAP Mean Pressure 9.0 cmH2O  Auto-CPAP Peak Average Pressure 13.3 cmH2O  Average Device Pressure <= 90% of Time 11.6 cmH2O  Average Time in Large Leak Per Day 4 mins. 52 secs.        Assessment:       1. BMI 50.0-59.9, adult    2. SHAWNA (obstructive sleep apnea)        Outpatient Encounter Medications as of 4/1/2019   Medication Sig Dispense Refill    cyclobenzaprine (FLEXERIL) 10 MG tablet TAKE 1 TABLET(10 MG) BY MOUTH EVERY EVENING AS NEEDED FOR MUSCLE SPASMS 30 tablet 0    ergocalciferol (ERGOCALCIFEROL) 50,000 unit Cap TAKE 1 CAPSULE BY MOUTH TWICE WEEKLY(ON MONDAY AND FRIDAY) 12 capsule 0    estradiol 0.1 mg/24 hr td ptwk 0.1 mg/24 hr PTWK PLACE 1 PATCH ONTO DRY SKIN WEEKLY  3    ibuprofen (ADVIL,MOTRIN) 800 MG tablet TAKE 1 TABLET BY MOUTH TWICE DAILY WITH MEALS 30 tablet 0    metFORMIN (GLUCOPHAGE) 500 MG tablet TAKE 1 TABLET(500 MG) BY MOUTH TWICE DAILY WITH A MEAL 180 tablet 0    multivitamin capsule Take 1 capsule by mouth once daily.      olmesartan-hydrochlorothiazide (BENICAR HCT) 40-25 mg per tablet Take 1 tablet by mouth once daily. 30 tablet 0    simvastatin (ZOCOR) 20 MG tablet Take 1 tablet (20 mg total) by mouth every evening. TAKE 1 TABLET(20 MG) BY MOUTH EVERY EVENING 30 tablet 0     No facility-administered encounter medications on file as of 4/1/2019.      Orders  Placed This Encounter   Procedures    CPAP/BIPAP SUPPLIES     Order Specific Question:   Type of mask:     Answer:   Nasal     Order Specific Question:   Headgear?     Answer:   Yes     Order Specific Question:   Tubing?     Answer:   Yes     Order Specific Question:   Humidifier chamber?     Answer:   Yes     Order Specific Question:   Chin strap?     Answer:   Yes     Order Specific Question:   Filters?     Answer:   Yes     Order Specific Question:   Cushions?     Answer:   Yes     Order Specific Question:   Length of need (1-99 months):     Answer:   99     Plan:        Problem List Items Addressed This Visit        Endocrine    BMI 50.0-59.9, adult    Overview     Weight loss and exercise to improve overall health.           Current Assessment & Plan     Discussed 1500cal diet            Other    SHAWNA (obstructive sleep apnea)    Overview     Doing well on PAP settings. Patient is compliant. Follow up annually         Relevant Orders    CPAP/BIPAP SUPPLIES

## 2019-04-04 ENCOUNTER — PATIENT MESSAGE (OUTPATIENT)
Dept: PULMONOLOGY | Facility: CLINIC | Age: 49
End: 2019-04-04

## 2019-04-08 ENCOUNTER — PATIENT MESSAGE (OUTPATIENT)
Dept: PULMONOLOGY | Facility: CLINIC | Age: 49
End: 2019-04-08

## 2019-04-10 ENCOUNTER — PATIENT MESSAGE (OUTPATIENT)
Dept: PULMONOLOGY | Facility: CLINIC | Age: 49
End: 2019-04-10

## 2019-04-15 ENCOUNTER — TELEPHONE (OUTPATIENT)
Dept: PULMONOLOGY | Facility: CLINIC | Age: 49
End: 2019-04-15

## 2019-04-15 NOTE — TELEPHONE ENCOUNTER
----- Message from Cindi Hyman sent at 4/12/2019  3:28 PM CDT -----  Regarding: PAP Equipment   Rossana,    Insurance person at [facility] sent over an appeal to Ms Sagastume's insurance company and they have approved for the rental to be converted to a purchase. She can keep the equipment.    Cindi Saleem, CRT-SDS

## 2019-04-30 ENCOUNTER — OFFICE VISIT (OUTPATIENT)
Dept: FAMILY MEDICINE | Facility: CLINIC | Age: 49
End: 2019-04-30
Payer: COMMERCIAL

## 2019-04-30 VITALS
BODY MASS INDEX: 47.09 KG/M2 | HEIGHT: 66 IN | HEART RATE: 105 BPM | WEIGHT: 293 LBS | DIASTOLIC BLOOD PRESSURE: 70 MMHG | OXYGEN SATURATION: 96 % | RESPIRATION RATE: 17 BRPM | TEMPERATURE: 97 F | SYSTOLIC BLOOD PRESSURE: 118 MMHG

## 2019-04-30 DIAGNOSIS — R73.03 PRE-DIABETES: ICD-10-CM

## 2019-04-30 DIAGNOSIS — E88.819 INSULIN RESISTANCE: ICD-10-CM

## 2019-04-30 DIAGNOSIS — E78.5 HYPERLIPIDEMIA, UNSPECIFIED HYPERLIPIDEMIA TYPE: ICD-10-CM

## 2019-04-30 DIAGNOSIS — E55.9 VITAMIN D DEFICIENCY DISEASE: ICD-10-CM

## 2019-04-30 DIAGNOSIS — I10 HYPERTENSION, UNSPECIFIED TYPE: ICD-10-CM

## 2019-04-30 DIAGNOSIS — G47.33 OSA (OBSTRUCTIVE SLEEP APNEA): ICD-10-CM

## 2019-04-30 PROCEDURE — 99999 PR PBB SHADOW E&M-EST. PATIENT-LVL III: ICD-10-PCS | Mod: PBBFAC,,, | Performed by: FAMILY MEDICINE

## 2019-04-30 PROCEDURE — 99214 PR OFFICE/OUTPT VISIT, EST, LEVL IV, 30-39 MIN: ICD-10-PCS | Mod: S$GLB,,, | Performed by: FAMILY MEDICINE

## 2019-04-30 PROCEDURE — 99999 PR PBB SHADOW E&M-EST. PATIENT-LVL III: CPT | Mod: PBBFAC,,, | Performed by: FAMILY MEDICINE

## 2019-04-30 PROCEDURE — 99214 OFFICE O/P EST MOD 30 MIN: CPT | Mod: S$GLB,,, | Performed by: FAMILY MEDICINE

## 2019-04-30 RX ORDER — CYCLOBENZAPRINE HCL 10 MG
TABLET ORAL
Qty: 30 TABLET | Refills: 1 | Status: SHIPPED | OUTPATIENT
Start: 2019-04-30

## 2019-04-30 RX ORDER — SIMVASTATIN 20 MG/1
20 TABLET, FILM COATED ORAL NIGHTLY
Qty: 90 TABLET | Refills: 0 | Status: SHIPPED | OUTPATIENT
Start: 2019-04-30 | End: 2020-01-21 | Stop reason: SDUPTHER

## 2019-04-30 RX ORDER — OLMESARTAN MEDOXOMIL AND HYDROCHLOROTHIAZIDE 40/25 40; 25 MG/1; MG/1
1 TABLET ORAL DAILY
Qty: 90 TABLET | Refills: 0 | Status: SHIPPED | OUTPATIENT
Start: 2019-04-30 | End: 2020-01-10

## 2019-04-30 RX ORDER — ERGOCALCIFEROL 1.25 MG/1
CAPSULE ORAL
Qty: 25 CAPSULE | Refills: 0 | Status: SHIPPED | OUTPATIENT
Start: 2019-04-30 | End: 2020-04-30

## 2019-04-30 RX ORDER — IBUPROFEN 800 MG/1
800 TABLET ORAL 2 TIMES DAILY WITH MEALS
Qty: 30 TABLET | Refills: 1 | Status: SHIPPED | OUTPATIENT
Start: 2019-04-30 | End: 2019-07-25 | Stop reason: SDUPTHER

## 2019-04-30 RX ORDER — METFORMIN HYDROCHLORIDE 500 MG/1
TABLET ORAL
Qty: 180 TABLET | Refills: 0 | Status: SHIPPED | OUTPATIENT
Start: 2019-04-30 | End: 2020-04-24

## 2019-04-30 NOTE — PROGRESS NOTES
Aliza Sagastume    Chief Complaint   Patient presents with    Hypertension    Pre-diabetes    Follow-up       History of Present Illness:   Ms. Sagastume comes in today for hypertension and pre-diabetes follow up. She is not fasting. She takes medications at night.  She has not been exercising but some times monitors diet.    She reports shortness of breath with activity due to deconditioning.  She desires to see cardiologist as has family history of heart disease.  She scheduled appointment with Dr. Mancini, cardiologist, but canceled due to her work schedule.  She saw cardiologist Dr. Mancini on Vanessa 15, 2018 for hypertension with cardiac work up and advised to increase HCTZ to 25 mg daily and to have ECHO, HARRIET's; however, she did not have tests performed due to deductible.    She saw NP Lejeune with pulmonary on April 1, 2019 for SHAWNA on CPAP/BIPAP surveillance with 6-month follow up advised.    Otherwise, she reports no acute problems today. She denies having fever, chills, fatigue, appetite change; cough, wheezing; chest pain, palpitations, leg swelling; abdominal pain, nausea, vomiting, diarrhea, constipation; unusual urinary symptoms; back pain; polydipsia, polyuria, polyphagia; headaches; anxiety or depression, homicidal or suicidal thoughts.      Labs:                      WBC                      6.26                12/18/2018                 HGB                      13.4                12/18/2018                 HCT                      42.6                12/18/2018                 PLT                      200                 12/18/2018                 CHOL                     211 (H)             07/25/2018                 TRIG                     72                  07/25/2018                 HDL                      72                  07/25/2018                 ALT                      14                  12/18/2018                 AST                      32                  12/18/2018                  NA                       138                 12/18/2018                 K                        3.6                 12/18/2018                 CL                       101                 12/18/2018                 CREATININE               1.4                 12/18/2018                 BUN                      32 (H)              12/18/2018                 CO2                      24                  12/18/2018                 TSH                      4.349 (H)           12/18/2018                 INR                      1.0                 07/30/2013                 HGBA1C                   5.5                 07/25/2018               LDLCALC                  124.6               07/25/2018          Vit D, 25-Hydroxy               16          07/25/2018      Current Outpatient Medications   Medication Sig    cyclobenzaprine (FLEXERIL) 10 MG tablet TAKE 1 TABLET(10 MG) BY MOUTH EVERY EVENING AS NEEDED FOR MUSCLE SPASMS    ergocalciferol (ERGOCALCIFEROL) 50,000 unit Cap TAKE 1 CAPSULE BY MOUTH TWICE WEEKLY(ON MONDAY AND FRIDAY)    ibuprofen (ADVIL,MOTRIN) 800 MG tablet TAKE 1 TABLET BY MOUTH TWICE DAILY WITH MEALS    metFORMIN (GLUCOPHAGE) 500 MG tablet TAKE 1 TABLET(500 MG) BY MOUTH TWICE DAILY WITH A MEAL    multivitamin capsule Take 1 capsule by mouth once daily.    olmesartan-hydrochlorothiazide (BENICAR HCT) 40-25 mg per tablet Take 1 tablet by mouth once daily.    simvastatin (ZOCOR) 20 MG tablet Take 1 tablet (20 mg total) by mouth every evening. TAKE 1 TABLET(20 MG) BY MOUTH EVERY EVENING       Review of Systems   Constitutional: Negative for activity change, appetite change, chills, fatigue and fever.        Weight 150.5 kg (331 lb 12.7 oz) at July 25, 2018 visit.   Respiratory: Positive for shortness of breath. Negative for cough and wheezing.         See history of present illness.   Cardiovascular: Negative for chest pain, palpitations and leg swelling.   Gastrointestinal: Negative for abdominal  pain, constipation, diarrhea, nausea and vomiting.   Endocrine: Negative for polydipsia, polyphagia and polyuria.   Genitourinary: Negative for difficulty urinating.   Musculoskeletal: Negative for arthralgias, back pain and joint swelling.   Neurological: Negative for headaches.   Psychiatric/Behavioral: Negative for dysphoric mood and suicidal ideas. The patient is not nervous/anxious.         Negative for homicidal thoughts.       Objective:  Physical Exam   Constitutional: She is oriented to person, place, and time. She appears well-developed and well-nourished. No distress.   Pleasant.   Neck: Normal range of motion. Neck supple. No thyromegaly present.   Cardiovascular: Normal rate, regular rhythm, normal heart sounds and intact distal pulses.   No murmur heard.  Pulmonary/Chest: Effort normal and breath sounds normal. No respiratory distress. She has no wheezes.   Abdominal: Soft. Bowel sounds are normal. She exhibits no distension and no mass. There is no tenderness. There is no rebound and no guarding.   Musculoskeletal: Normal range of motion. She exhibits no edema or tenderness.   She is ambulatory without problems.   Lymphadenopathy:     She has no cervical adenopathy.   Neurological: She is alert and oriented to person, place, and time.   Skin: She is not diaphoretic.   Psychiatric: She has a normal mood and affect. Her behavior is normal. Judgment and thought content normal.   Vitals reviewed.      ASSESSMENT:  1. Hypertension, unspecified type    2. Pre-diabetes    3. Insulin resistance    4. Hyperlipidemia, unspecified hyperlipidemia type    5. Vitamin D deficiency disease    6. SHAWNA (obstructive sleep apnea)    7. BMI 50.0-59.9, adult        PLAN:  Aliza was seen today for hypertension, pre-diabetes and follow-up.    Diagnoses and all orders for this visit:    Hypertension, unspecified type  -     olmesartan-hydrochlorothiazide (BENICAR HCT) 40-25 mg per tablet; Take 1 tablet by mouth once  daily.    Pre-diabetes  -     metFORMIN (GLUCOPHAGE) 500 MG tablet; TAKE 1 TABLET(500 MG) BY MOUTH TWICE DAILY WITH A MEAL    Insulin resistance    Hyperlipidemia, unspecified hyperlipidemia type  -     simvastatin (ZOCOR) 20 MG tablet; Take 1 tablet (20 mg total) by mouth every evening. TAKE 1 TABLET(20 MG) BY MOUTH EVERY EVENING    Vitamin D deficiency disease  -     ergocalciferol (ERGOCALCIFEROL) 50,000 unit Cap; TAKE 1 CAPSULE BY MOUTH TWICE WEEKLY(ON MONDAY AND FRIDAY)    SHAWNA (obstructive sleep apnea)    BMI 50.0-59.9, adult    Other orders  -     ibuprofen (ADVIL,MOTRIN) 800 MG tablet; Take 1 tablet (800 mg total) by mouth 2 (two) times daily with meals.  -     cyclobenzaprine (FLEXERIL) 10 MG tablet; TAKE 1 TABLET(10 MG) BY MOUTH EVERY EVENING AS NEEDED FOR MUSCLE SPASMS       Patient declines labs today.  Continue current medications, follow low sodium, low cholesterol, low carb diet, daily walks.  Prescription refills as noted above.  Keep follow up with specialists.  Flu shot this fall.  Follow up in about 3 months (around 7/25/2019) for physical.

## 2019-06-27 ENCOUNTER — PATIENT MESSAGE (OUTPATIENT)
Dept: PULMONOLOGY | Facility: CLINIC | Age: 49
End: 2019-06-27

## 2019-06-28 ENCOUNTER — OFFICE VISIT (OUTPATIENT)
Dept: CARDIOLOGY | Facility: CLINIC | Age: 49
End: 2019-06-28
Payer: COMMERCIAL

## 2019-06-28 VITALS
HEIGHT: 66 IN | DIASTOLIC BLOOD PRESSURE: 86 MMHG | SYSTOLIC BLOOD PRESSURE: 136 MMHG | BODY MASS INDEX: 47.09 KG/M2 | WEIGHT: 293 LBS | HEART RATE: 76 BPM

## 2019-06-28 DIAGNOSIS — G47.33 OSA (OBSTRUCTIVE SLEEP APNEA): ICD-10-CM

## 2019-06-28 DIAGNOSIS — E78.5 HYPERLIPIDEMIA, UNSPECIFIED HYPERLIPIDEMIA TYPE: ICD-10-CM

## 2019-06-28 DIAGNOSIS — G47.33 OSA (OBSTRUCTIVE SLEEP APNEA): Primary | ICD-10-CM

## 2019-06-28 DIAGNOSIS — R06.09 DOE (DYSPNEA ON EXERTION): Primary | ICD-10-CM

## 2019-06-28 DIAGNOSIS — I73.9 PVD (PERIPHERAL VASCULAR DISEASE): ICD-10-CM

## 2019-06-28 DIAGNOSIS — I10 ESSENTIAL HYPERTENSION: ICD-10-CM

## 2019-06-28 PROCEDURE — 99214 OFFICE O/P EST MOD 30 MIN: CPT | Mod: S$GLB,,, | Performed by: INTERNAL MEDICINE

## 2019-06-28 PROCEDURE — 99214 PR OFFICE/OUTPT VISIT, EST, LEVL IV, 30-39 MIN: ICD-10-PCS | Mod: S$GLB,,, | Performed by: INTERNAL MEDICINE

## 2019-06-28 PROCEDURE — 99999 PR PBB SHADOW E&M-EST. PATIENT-LVL III: CPT | Mod: PBBFAC,,, | Performed by: INTERNAL MEDICINE

## 2019-06-28 PROCEDURE — 99999 PR PBB SHADOW E&M-EST. PATIENT-LVL III: ICD-10-PCS | Mod: PBBFAC,,, | Performed by: INTERNAL MEDICINE

## 2019-06-28 NOTE — PROGRESS NOTES
Subjective:   Patient ID:  Aliza Sagastume is a 48 y.o. 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  Improved fatigue.  No chest pain.  LEAL if walking fast. Has knee pain while walking  Ankle well at night and better in morning.   EKG NSR  No smoking and occasional drinking  Father 1st heart attack at 60. Mother HTN. No f/h premature CAD.        Past Medical History:   Diagnosis Date    CEDRICK positive 08/2017    High cholesterol     History of vitamin D deficiency     Hypertension     Hypothyroidism     Insulin resistance 9/12/2013    Kidney stones     Pre-diabetes     Snoring        Past Surgical History:   Procedure Laterality Date    CYSTOSCOPY Left 11/19/2013    Performed by [facility] at [facility] OR    CYSTOSCOPY W/ LASER LITHOTRIPSY      x 2    CYSTOURETEROSCOPY, WITH RETROGRADE PYELOGRAM AND URETERAL STENT INSERTION  10/22/2013    Performed by [facility], [facility] at [facility] OR    DILATION AND CURETTAGE OF UTERUS      x1 for abnormal bleeding    LITHOTRIPSY, ESWL Left 11/19/2013    Performed by [facility], [facility] at [facility] OR    REMOVAL, STENT, URETER Left 11/19/2013    Performed by [facility], [facility] at [facility] OR    URETEROSCOPY  10/22/2013    Performed by [facility] at [facility] OR    UTERINE FIBROID SURGERY  2019       Social History     Tobacco Use    Smoking status: Never Smoker    Smokeless tobacco: Never Used   Substance Use Topics    Alcohol use: Yes     Alcohol/week: 0.0 oz     Comment: occasionally    Drug use: No       Family History   Problem Relation Age of Onset    Hypertension Mother     Heart disease Father 69        MI    Hypertension Father     Diabetes Father     Cancer Father         prostate cancer    Hypertension Sister     Cancer Paternal Aunt         Brain cancer    Cancer Paternal Uncle         Pancreas cancer    Hypertension  Maternal Grandmother     Hypertension Maternal Grandfather     Stroke Maternal Grandfather 86    Hypertension Paternal Grandmother     Hypertension Paternal Grandfather     No Known Problems Daughter     No Known Problems Daughter     Cancer Cousin         Breast cancer    Breast cancer Paternal Cousin          Review of Systems   Constitution: Positive for malaise/fatigue. Negative for decreased appetite, diaphoresis, fever and night sweats.   HENT: Negative for nosebleeds.    Eyes: Negative for blurred vision and double vision.   Cardiovascular: Positive for dyspnea on exertion. Negative for chest pain, claudication, irregular heartbeat, leg swelling, near-syncope, orthopnea, palpitations, paroxysmal nocturnal dyspnea and syncope.   Respiratory: Positive for snoring. Negative for cough, shortness of breath, sleep disturbances due to breathing, sputum production and wheezing.    Endocrine: Negative for cold intolerance and polyuria.   Hematologic/Lymphatic: Does not bruise/bleed easily.   Skin: Negative for rash.   Musculoskeletal: Negative for back pain, falls, joint pain, joint swelling and neck pain.   Gastrointestinal: Negative for abdominal pain, heartburn, nausea and vomiting.   Genitourinary: Negative for dysuria, frequency and hematuria.   Neurological: Negative for difficulty with concentration, dizziness, focal weakness, headaches, light-headedness, numbness, seizures and weakness.   Psychiatric/Behavioral: Negative for depression, memory loss and substance abuse. The patient does not have insomnia.    Allergic/Immunologic: Negative for HIV exposure and hives.       Objective:   Physical Exam   Constitutional: She is oriented to person, place, and time. She appears well-nourished.   HENT:   Head: Normocephalic.   Eyes: Pupils are equal, round, and reactive to light.   Neck: Normal carotid pulses and no JVD present. Carotid bruit is not present. No thyromegaly present.   Cardiovascular: Normal  rate, regular rhythm, normal heart sounds and normal pulses.  No extrasystoles are present. PMI is not displaced. Exam reveals no gallop and no S3.   No murmur heard.  Pulmonary/Chest: Breath sounds normal. No stridor. No respiratory distress.   Abdominal: Soft. Bowel sounds are normal. There is no tenderness. There is no rebound.   Musculoskeletal: Normal range of motion.   Neurological: She is alert and oriented to person, place, and time.   Skin: Skin is intact. No rash noted.   Psychiatric: Her behavior is normal.       Lab Results   Component Value Date    CHOL 211 (H) 07/25/2018    CHOL 212 (H) 08/28/2017    CHOL 224 (H) 03/15/2016     Lab Results   Component Value Date    HDL 72 07/25/2018    HDL 70 08/28/2017    HDL 77 (H) 03/15/2016     Lab Results   Component Value Date    LDLCALC 124.6 07/25/2018    LDLCALC 127.0 08/28/2017    LDLCALC 131.8 03/15/2016     Lab Results   Component Value Date    TRIG 72 07/25/2018    TRIG 75 08/28/2017    TRIG 76 03/15/2016     Lab Results   Component Value Date    CHOLHDL 34.1 07/25/2018    CHOLHDL 33.0 08/28/2017    CHOLHDL 34.4 03/15/2016       Chemistry        Component Value Date/Time     12/18/2018 0004    K 3.6 12/18/2018 0004     12/18/2018 0004    CO2 24 12/18/2018 0004    BUN 32 (H) 12/18/2018 0004    CREATININE 1.4 12/18/2018 0004     (H) 12/18/2018 0004        Component Value Date/Time    CALCIUM 9.7 12/18/2018 0004    ALKPHOS 76 12/18/2018 0004    AST 32 12/18/2018 0004    ALT 14 12/18/2018 0004    BILITOT 0.3 12/18/2018 0004    ESTGFRAFRICA 51 (A) 12/18/2018 0004    EGFRNONAA 44 (A) 12/18/2018 0004          Lab Results   Component Value Date    HGBA1C 5.5 07/25/2018     Lab Results   Component Value Date    TSH 4.349 (H) 12/18/2018     Lab Results   Component Value Date    INR 1.0 07/30/2013     Lab Results   Component Value Date    WBC 6.26 12/18/2018    HGB 13.4 12/18/2018    HCT 42.6 12/18/2018    MCV 83 12/18/2018     12/18/2018      BMP  Sodium   Date Value Ref Range Status   12/18/2018 138 136 - 145 mmol/L Final     Potassium   Date Value Ref Range Status   12/18/2018 3.6 3.5 - 5.1 mmol/L Final     Chloride   Date Value Ref Range Status   12/18/2018 101 95 - 110 mmol/L Final     CO2   Date Value Ref Range Status   12/18/2018 24 23 - 29 mmol/L Final     BUN, Bld   Date Value Ref Range Status   12/18/2018 32 (H) 6 - 20 mg/dL Final     Creatinine   Date Value Ref Range Status   12/18/2018 1.4 0.5 - 1.4 mg/dL Final     Calcium   Date Value Ref Range Status   12/18/2018 9.7 8.7 - 10.5 mg/dL Final     Anion Gap   Date Value Ref Range Status   12/18/2018 13 8 - 16 mmol/L Final     eGFR if    Date Value Ref Range Status   12/18/2018 51 (A) >60 mL/min/1.73 m^2 Final     eGFR if non    Date Value Ref Range Status   12/18/2018 44 (A) >60 mL/min/1.73 m^2 Final     Comment:     Calculation used to obtain the estimated glomerular filtration  rate (eGFR) is the CKD-EPI equation.        BNP  @LABRCNTIP(BNP,BNPTRIAGEBLO)@  @LABRCNTIP(troponini)@  CrCl cannot be calculated (Patient's most recent lab result is older than the maximum 7 days allowed.).  No results found in the last 24 hours.  No results found in the last 24 hours.  No results found in the last 24 hours.    Assessment:      1. Essential hypertension    2. Hyperlipidemia, unspecified hyperlipidemia type    3. PVD (peripheral vascular disease)    4. BMI 50.0-59.9, adult    5. SHAWNA (obstructive sleep apnea)    6. LEAL (dyspnea on exertion)      LEAL still  CP improved  BP, LDL and BS wnl    Plan:   ETT to r/o ischemic etiology  Echo for LEAL and HTN  Continue Statin and Benicar-HCT  Continue current meds.  Recommend heart-healthy diet, weight control and regular exercise.  Oh. Risk modification.   RTC as needed    I have reviewed all pertinent labs and cardiac studies. Plans and recommendations have been formulated under my direct supervision. All questions answered  and patient voiced understanding. Patient to continue current medications.

## 2019-07-18 ENCOUNTER — PATIENT OUTREACH (OUTPATIENT)
Dept: ADMINISTRATIVE | Facility: HOSPITAL | Age: 49
End: 2019-07-18

## 2019-07-18 NOTE — PROGRESS NOTES
PreVisit Chart Audit Perfomed       Fabiola HORN LPN Care Coordinator  Care Coordination Department  [facility]  [phone]

## 2019-07-25 ENCOUNTER — OFFICE VISIT (OUTPATIENT)
Dept: FAMILY MEDICINE | Facility: CLINIC | Age: 49
End: 2019-07-25
Payer: COMMERCIAL

## 2019-07-25 ENCOUNTER — LAB VISIT (OUTPATIENT)
Dept: LAB | Facility: HOSPITAL | Age: 49
End: 2019-07-25
Attending: FAMILY MEDICINE
Payer: COMMERCIAL

## 2019-07-25 VITALS
OXYGEN SATURATION: 99 % | HEART RATE: 90 BPM | WEIGHT: 293 LBS | BODY MASS INDEX: 47.09 KG/M2 | DIASTOLIC BLOOD PRESSURE: 78 MMHG | HEIGHT: 66 IN | TEMPERATURE: 98 F | SYSTOLIC BLOOD PRESSURE: 136 MMHG

## 2019-07-25 DIAGNOSIS — I73.9 PVD (PERIPHERAL VASCULAR DISEASE): ICD-10-CM

## 2019-07-25 DIAGNOSIS — Z12.11 COLON CANCER SCREENING: ICD-10-CM

## 2019-07-25 DIAGNOSIS — Z00.00 ANNUAL PHYSICAL EXAM: Primary | ICD-10-CM

## 2019-07-25 DIAGNOSIS — I10 ESSENTIAL HYPERTENSION: ICD-10-CM

## 2019-07-25 DIAGNOSIS — R73.03 PREDIABETES: ICD-10-CM

## 2019-07-25 DIAGNOSIS — E55.9 VITAMIN D DEFICIENCY DISEASE: ICD-10-CM

## 2019-07-25 DIAGNOSIS — G47.33 OSA (OBSTRUCTIVE SLEEP APNEA): ICD-10-CM

## 2019-07-25 DIAGNOSIS — M25.569 KNEE PAIN, UNSPECIFIED CHRONICITY, UNSPECIFIED LATERALITY: ICD-10-CM

## 2019-07-25 DIAGNOSIS — E78.5 HYPERLIPIDEMIA, UNSPECIFIED HYPERLIPIDEMIA TYPE: ICD-10-CM

## 2019-07-25 DIAGNOSIS — E88.819 INSULIN RESISTANCE: ICD-10-CM

## 2019-07-25 DIAGNOSIS — Z00.00 ANNUAL PHYSICAL EXAM: ICD-10-CM

## 2019-07-25 DIAGNOSIS — Z12.31 VISIT FOR SCREENING MAMMOGRAM: ICD-10-CM

## 2019-07-25 DIAGNOSIS — N95.1 PERIMENOPAUSAL: ICD-10-CM

## 2019-07-25 LAB
ALBUMIN SERPL BCP-MCNC: 3.8 G/DL (ref 3.5–5.2)
ALP SERPL-CCNC: 75 U/L (ref 55–135)
ALT SERPL W/O P-5'-P-CCNC: 9 U/L (ref 10–44)
ANION GAP SERPL CALC-SCNC: 10 MMOL/L (ref 8–16)
AST SERPL-CCNC: 14 U/L (ref 10–40)
BACTERIA #/AREA URNS AUTO: NORMAL /HPF
BASOPHILS # BLD AUTO: 0.05 K/UL (ref 0–0.2)
BASOPHILS NFR BLD: 0.7 % (ref 0–1.9)
BILIRUB SERPL-MCNC: 0.3 MG/DL (ref 0.1–1)
BILIRUB UR QL STRIP: NEGATIVE
BUN SERPL-MCNC: 17 MG/DL (ref 6–20)
CALCIUM SERPL-MCNC: 9.6 MG/DL (ref 8.7–10.5)
CHLORIDE SERPL-SCNC: 103 MMOL/L (ref 95–110)
CHOLEST SERPL-MCNC: 197 MG/DL (ref 120–199)
CHOLEST/HDLC SERPL: 2.6 {RATIO} (ref 2–5)
CLARITY UR REFRACT.AUTO: CLEAR
CO2 SERPL-SCNC: 25 MMOL/L (ref 23–29)
COLOR UR AUTO: YELLOW
CREAT SERPL-MCNC: 1 MG/DL (ref 0.5–1.4)
DIFFERENTIAL METHOD: ABNORMAL
EOSINOPHIL # BLD AUTO: 0.1 K/UL (ref 0–0.5)
EOSINOPHIL NFR BLD: 1.5 % (ref 0–8)
ERYTHROCYTE [DISTWIDTH] IN BLOOD BY AUTOMATED COUNT: 14.2 % (ref 11.5–14.5)
EST. GFR  (AFRICAN AMERICAN): >60 ML/MIN/1.73 M^2
EST. GFR  (NON AFRICAN AMERICAN): >60 ML/MIN/1.73 M^2
ESTIMATED AVG GLUCOSE: 128 MG/DL (ref 68–131)
GLUCOSE SERPL-MCNC: 97 MG/DL (ref 70–110)
GLUCOSE UR QL STRIP: NEGATIVE
HBA1C MFR BLD HPLC: 6.1 % (ref 4–5.6)
HCT VFR BLD AUTO: 42.7 % (ref 37–48.5)
HDLC SERPL-MCNC: 77 MG/DL (ref 40–75)
HDLC SERPL: 39.1 % (ref 20–50)
HGB BLD-MCNC: 12.9 G/DL (ref 12–16)
HGB UR QL STRIP: ABNORMAL
IMM GRANULOCYTES # BLD AUTO: 0.04 K/UL (ref 0–0.04)
IMM GRANULOCYTES NFR BLD AUTO: 0.6 % (ref 0–0.5)
KETONES UR QL STRIP: NEGATIVE
LDLC SERPL CALC-MCNC: 104.6 MG/DL (ref 63–159)
LEUKOCYTE ESTERASE UR QL STRIP: NEGATIVE
LYMPHOCYTES # BLD AUTO: 1.9 K/UL (ref 1–4.8)
LYMPHOCYTES NFR BLD: 26 % (ref 18–48)
MCH RBC QN AUTO: 25.4 PG (ref 27–31)
MCHC RBC AUTO-ENTMCNC: 30.2 G/DL (ref 32–36)
MCV RBC AUTO: 84 FL (ref 82–98)
MICROSCOPIC COMMENT: NORMAL
MONOCYTES # BLD AUTO: 0.5 K/UL (ref 0.3–1)
MONOCYTES NFR BLD: 6.3 % (ref 4–15)
NEUTROPHILS # BLD AUTO: 4.7 K/UL (ref 1.8–7.7)
NEUTROPHILS NFR BLD: 64.9 % (ref 38–73)
NITRITE UR QL STRIP: NEGATIVE
NONHDLC SERPL-MCNC: 120 MG/DL
NRBC BLD-RTO: 0 /100 WBC
PH UR STRIP: 5 [PH] (ref 5–8)
PLATELET # BLD AUTO: 208 K/UL (ref 150–350)
PMV BLD AUTO: 11.7 FL (ref 9.2–12.9)
POTASSIUM SERPL-SCNC: 4.1 MMOL/L (ref 3.5–5.1)
PROT SERPL-MCNC: 7.8 G/DL (ref 6–8.4)
PROT UR QL STRIP: NEGATIVE
RBC # BLD AUTO: 5.07 M/UL (ref 4–5.4)
RBC #/AREA URNS AUTO: 1 /HPF (ref 0–4)
SODIUM SERPL-SCNC: 138 MMOL/L (ref 136–145)
SP GR UR STRIP: 1.02 (ref 1–1.03)
SQUAMOUS #/AREA URNS AUTO: 3 /HPF
TRIGL SERPL-MCNC: 77 MG/DL (ref 30–150)
TSH SERPL DL<=0.005 MIU/L-ACNC: 3.11 UIU/ML (ref 0.4–4)
URN SPEC COLLECT METH UR: ABNORMAL
WBC # BLD AUTO: 7.2 K/UL (ref 3.9–12.7)
WBC #/AREA URNS AUTO: 2 /HPF (ref 0–5)

## 2019-07-25 PROCEDURE — 99396 PR PREVENTIVE VISIT,EST,40-64: ICD-10-PCS | Mod: S$GLB,,, | Performed by: FAMILY MEDICINE

## 2019-07-25 PROCEDURE — 82306 VITAMIN D 25 HYDROXY: CPT

## 2019-07-25 PROCEDURE — 36415 COLL VENOUS BLD VENIPUNCTURE: CPT | Mod: PO

## 2019-07-25 PROCEDURE — 99396 PREV VISIT EST AGE 40-64: CPT | Mod: S$GLB,,, | Performed by: FAMILY MEDICINE

## 2019-07-25 PROCEDURE — 80053 COMPREHEN METABOLIC PANEL: CPT

## 2019-07-25 PROCEDURE — 85025 COMPLETE CBC W/AUTO DIFF WBC: CPT

## 2019-07-25 PROCEDURE — 84443 ASSAY THYROID STIM HORMONE: CPT

## 2019-07-25 PROCEDURE — 82607 VITAMIN B-12: CPT

## 2019-07-25 PROCEDURE — 99999 PR PBB SHADOW E&M-EST. PATIENT-LVL IV: ICD-10-PCS | Mod: PBBFAC,,, | Performed by: FAMILY MEDICINE

## 2019-07-25 PROCEDURE — 99999 PR PBB SHADOW E&M-EST. PATIENT-LVL IV: CPT | Mod: PBBFAC,,, | Performed by: FAMILY MEDICINE

## 2019-07-25 PROCEDURE — 83525 ASSAY OF INSULIN: CPT

## 2019-07-25 PROCEDURE — 81001 URINALYSIS AUTO W/SCOPE: CPT

## 2019-07-25 PROCEDURE — 83036 HEMOGLOBIN GLYCOSYLATED A1C: CPT

## 2019-07-25 PROCEDURE — 80061 LIPID PANEL: CPT

## 2019-07-25 RX ORDER — IBUPROFEN 800 MG/1
800 TABLET ORAL 2 TIMES DAILY WITH MEALS
Qty: 60 TABLET | Refills: 1 | Status: SHIPPED | OUTPATIENT
Start: 2019-07-25 | End: 2020-01-28

## 2019-07-25 NOTE — PROGRESS NOTES
HISTORY OF PRESENT ILLNESS: Ms. Sagastume who comes in today for annual wellness examination. She is fasting and has not taken medication today.      END OF LIFE DECISION: She does not have a living will and does desire life support.    Current Outpatient Medications   Medication Sig    cyclobenzaprine (FLEXERIL) 10 MG tablet TAKE 1 TABLET(10 MG) BY MOUTH EVERY EVENING AS NEEDED FOR MUSCLE SPASMS    ergocalciferol (ERGOCALCIFEROL) 50,000 unit Cap TAKE 1 CAPSULE BY MOUTH TWICE WEEKLY(ON MONDAY AND FRIDAY)    ibuprofen (ADVIL,MOTRIN) 800 MG tablet Take 1 tablet (800 mg total) by mouth 2 (two) times daily with meals.    metFORMIN (GLUCOPHAGE) 500 MG tablet TAKE 1 TABLET(500 MG) BY MOUTH TWICE DAILY WITH A MEAL    multivitamin capsule Take 1 capsule by mouth once daily.    olmesartan-hydrochlorothiazide (BENICAR HCT) 40-25 mg per tablet Take 1 tablet by mouth once daily.    simvastatin (ZOCOR) 20 MG tablet Take 1 tablet (20 mg total) by mouth every evening. TAKE 1 TABLET(20 MG) BY MOUTH EVERY EVENING     SCREENINGS:   Cholesterol: July 25, 2018.  FFS/Colonoscopy: Never. Desires FIT-KIT.  Mammogram: August 14, 2018 - okay.  GYN Exam (breast): July 25, 2018 - okay. She states she saw GYN last year for pap smear - okay per patient.  Dexa Scan: Never.  Eye Exam: Never. Years ago per patient.  She wears reading glasses for the computer.  PPD: Never.  Immunizations: Td/Tdap - more than 10 years ago; she declines.  Gardasil - N./A.  Zostavax - N./A.  Shingrix - Never. She declines.  Pneumovax - never.  Seasonal Flu - She declines.      ROS:  GENERAL: Denies fever, chills, fatigue. Appetite normal. Weight 160.2 kg (353 lb 4.6 oz) at April 30, 2019 visit. Not currently exercises. Monitors diet some times.  SKIN: Denies rashes, itching, changes in mole, color or texture of skin or easy bruising.  HEAD:  Denies headaches or recent head trauma.  EYES: Denies change in vision, pain, diplopia, redness or discharge.  EARS: Denies  "ear pain, discharge, vertigo or decreased hearing.  NOSE: Denies loss of smell, epistaxis or rhinitis.  MOUTH & THROAT: Denies hoarseness or change in voice. Denies excessive gum bleeding or mouth sores.  Denies sore throat.  NODES: Denies swollen glands.  CHEST: Denies wheezing, cough, or sputum production. Saw NP Lejeune with pulmonary on April 1, 2019 for SHAWNA on CPAP with 6-month follow up advised. Reports shortness of breath with walking.   CARDIOVASCULAR: Denies chest pain, PND, orthopnea or reduced exercise tolerance.  Denies palpitations. Saw cardiologist Dr. Mancini on June 28, 2019 for LEAL, hypertension, hyperlipidemia, PVD, SHAWNA and scheduled for treadmill stress test and ECHO at next visit.  ABDOMEN: Denies diarrhea, constipation, nausea, vomiting, abdominal pain, or blood in stool.  URINARY: Denies flank pain, dysuria or hematuria.  GENITOURINARY: Denies flank pain, dysuria, frequency or hematuria. No change in menses. Does not perform monthly breast self examination.  ENDOCRINE: Currently taking cholesterol-lowering medication and vitamin D 50K units 2 times per week and Metformin for pre-diabetes.  HEME/LYMPH: Denies bleeding problems. Reports receives B12 shots every other week from [facility] and states shots help with her energy level.  PERIPHERAL VASCULAR:Denies claudication or cyanosis.  MUSCULOSKELETAL: Denies joint stiffness, pain or swelling. Denies edema. Reports occasional knee(s) pain and requests refill for Ibuprofen.  NEUROLOGIC: Denies history of seizures, tremors, paralysis, alteration of gait or coordination.  PSYCHIATRIC: Denies mood swings, depression, anxiety, homicidal or suicidal thoughts. Reports improved sleeping issues.    PE:   VS: /78   Pulse 90   Temp 97.9 °F (36.6 °C) (Oral)   Ht 5' 6" (1.676 m)   Wt (!) 166 kg (366 lb 1.2 oz)   LMP  (LMP Unknown) Comment: Perimenopausal  SpO2 99%   BMI 59.09 kg/m²   APPEARANCE: Well nourished, well developed female, obese and " pleasant, alert and oriented in no acute distress.   HEAD: Nontender. Full range of motion.  EYES: PERRL, conjunctiva pink, lids no edema.  EARS: External canal patent, no swelling or redness. TM's shiny and clear.  NOSE: Mucosa and turbinates pink, not swollen. No discharge. Nontender sinuses.  THROAT: No pharyngeal erythema or exudate. No stridor.   NECK: Supple, no mass, non tender, no thyroid enlargement.  NODES: No cervical, axillary and inguinal lymph node enlargement.  CHEST: Normal respiratory effort. Lungs clear to auscultation.  CARDIOVASCULAR: Normal S1, S2. No rubs, murmurs or gallops. PMI not displaced. No carotid bruit. Pedal pulses palpable bilaterally. No edema.  ABDOMEN: Bowel sounds present. Not distended. Soft. No tenderness, masses or organomegaly.  BREAST EXAM: Symmetrical, no external lesions, no discharge, no masses palpated.  PELVIC EXAM:  Not examined as patient declines.   RECTAL EXAM: Not examined as patient declines.  MUSCULOSKELETAL: No joint deformities or stiffness. She is ambulatory without problems. Non tender knee(s) with full range of motion noted.  SKIN: No rashes or suspicious lesions, normal color and turgor.  NEUROLOGIC: Cranial Nerves: II-XII grossly intact. DTR's: Knees, Ankles 2+ and equal bilaterally. Gait & Posture: Normal gait and fine motion.  PSYCHIATRIC: Patient alert, oriented x 3. Mood/Affect normal without acute anxiety or depression noted. Judgment/insight good as she makes appropriate decisions on today's examination.     ASSESSMENT:    ICD-10-CM ICD-9-CM    1. Annual physical exam Z00.00 V70.0 CBC auto differential      TSH      Urinalysis      Comprehensive metabolic panel      Lipid panel      Insulin, random      Hemoglobin A1c      Vitamin D      Vitamin B12   2. Essential hypertension I10 401.9    3. Hyperlipidemia, unspecified hyperlipidemia type E78.5 272.4    4. Insulin resistance E88.81 277.7    5. Prediabetes R73.03 790.29    6. Vitamin D deficiency  disease E55.9 268.9    7. PVD (peripheral vascular disease) I73.9 443.9    8. SHAWNA (obstructive sleep apnea) G47.33 327.23    9. Knee pain, unspecified chronicity, unspecified laterality M25.569 719.46 ibuprofen (ADVIL,MOTRIN) 800 MG tablet   10. BMI 50.0-59.9, adult Z68.43 V85.43    11. Perimenopausal N95.1 627.2    12. Visit for screening mammogram Z12.31 V76.12 Mammo Digital Screening Bilat   13. Colon cancer screening Z12.11 V76.51 Fecal Immunochemical Test (iFOBT)     PLAN:  1. Age-appropriate counseling-appropriate low-sodium, low-cholesterol, low carbohydrate diet and exercise daily, monthly breast self exam, annual wellness examination.   2. Patient advised to call for results.  3. Continue current medications.  4. Prescription refill as noted above.  5. Keep follow up with specialists.  6. Flu shot this fall.  7. Follow up in about 5 months (around 1/7/2020) for  pre-diabetes and hypertension follow up.     Answers for HPI/ROS submitted by the patient on 7/25/2019   activity change: No  unexpected weight change: Yes  neck pain: No  hearing loss: No  rhinorrhea: No  trouble swallowing: No  eye discharge: No  visual disturbance: Yes  chest tightness: No  wheezing: No  chest pain: No  palpitations: No  blood in stool: No  constipation: No  vomiting: No  diarrhea: No  polydipsia: No  polyuria: No  difficulty urinating: No  hematuria: No  menstrual problem: No  dysuria: No  joint swelling: No  arthralgias: No  headaches: No  weakness: No  confusion: No  dysphoric mood: No

## 2019-07-26 LAB
25(OH)D3+25(OH)D2 SERPL-MCNC: 20 NG/ML (ref 30–96)
INSULIN COLLECTION INTERVAL: NORMAL
INSULIN SERPL-ACNC: 16.7 UU/ML
VIT B12 SERPL-MCNC: 1934 PG/ML (ref 210–950)

## 2019-08-01 ENCOUNTER — CLINICAL SUPPORT (OUTPATIENT)
Dept: CARDIOLOGY | Facility: CLINIC | Age: 49
End: 2019-08-01
Attending: INTERNAL MEDICINE
Payer: COMMERCIAL

## 2019-08-01 ENCOUNTER — PATIENT OUTREACH (OUTPATIENT)
Dept: ADMINISTRATIVE | Facility: HOSPITAL | Age: 49
End: 2019-08-01

## 2019-08-01 DIAGNOSIS — I10 ESSENTIAL HYPERTENSION: ICD-10-CM

## 2019-08-01 DIAGNOSIS — R06.09 DOE (DYSPNEA ON EXERTION): ICD-10-CM

## 2019-08-01 PROCEDURE — 93306 TTE W/DOPPLER COMPLETE: CPT | Mod: S$GLB,,, | Performed by: INTERNAL MEDICINE

## 2019-08-01 PROCEDURE — 93015 CV STRESS TEST SUPVJ I&R: CPT | Mod: S$GLB,,, | Performed by: INTERNAL MEDICINE

## 2019-08-01 PROCEDURE — 93306 2D ECHO WITH COLOR FLOW DOPPLER: ICD-10-PCS | Mod: S$GLB,,, | Performed by: INTERNAL MEDICINE

## 2019-08-01 PROCEDURE — 93015 CARDIAC TREADMILL STRESS TEST: ICD-10-PCS | Mod: S$GLB,,, | Performed by: INTERNAL MEDICINE

## 2019-08-01 NOTE — PROGRESS NOTES
Contacted patient to follow up on overdue fit kit. Patient states she forgot about it, but will try to do this weekend and turn it in on Monday

## 2019-08-02 LAB
DIASTOLIC DYSFUNCTION: NO
DIASTOLIC DYSFUNCTION: NO
RETIRED EF AND QEF - SEE NOTES: 60 (ref 55–65)

## 2020-01-10 DIAGNOSIS — I10 HYPERTENSION, UNSPECIFIED TYPE: ICD-10-CM

## 2020-01-10 RX ORDER — OLMESARTAN MEDOXOMIL AND HYDROCHLOROTHIAZIDE 40/25 40; 25 MG/1; MG/1
TABLET ORAL
Qty: 30 TABLET | Refills: 0 | Status: SHIPPED | OUTPATIENT
Start: 2020-01-10 | End: 2020-03-13 | Stop reason: SDUPTHER

## 2020-01-21 ENCOUNTER — PATIENT MESSAGE (OUTPATIENT)
Dept: FAMILY MEDICINE | Facility: CLINIC | Age: 50
End: 2020-01-21

## 2020-01-21 DIAGNOSIS — E78.5 HYPERLIPIDEMIA, UNSPECIFIED HYPERLIPIDEMIA TYPE: ICD-10-CM

## 2020-01-22 RX ORDER — SIMVASTATIN 20 MG/1
20 TABLET, FILM COATED ORAL NIGHTLY
Qty: 90 TABLET | Refills: 0 | Status: SHIPPED | OUTPATIENT
Start: 2020-01-22 | End: 2020-07-28

## 2020-01-27 DIAGNOSIS — M25.569 KNEE PAIN, UNSPECIFIED CHRONICITY, UNSPECIFIED LATERALITY: ICD-10-CM

## 2020-01-28 RX ORDER — IBUPROFEN 800 MG/1
TABLET ORAL
Qty: 30 TABLET | Refills: 0 | Status: SHIPPED | OUTPATIENT
Start: 2020-01-28 | End: 2020-03-04

## 2020-03-04 DIAGNOSIS — M25.569 KNEE PAIN, UNSPECIFIED CHRONICITY, UNSPECIFIED LATERALITY: ICD-10-CM

## 2020-03-04 RX ORDER — IBUPROFEN 800 MG/1
TABLET ORAL
Qty: 30 TABLET | Refills: 0 | Status: SHIPPED | OUTPATIENT
Start: 2020-03-04 | End: 2020-04-30

## 2020-03-06 ENCOUNTER — PATIENT OUTREACH (OUTPATIENT)
Dept: ADMINISTRATIVE | Facility: HOSPITAL | Age: 50
End: 2020-03-06

## 2020-03-13 ENCOUNTER — PATIENT MESSAGE (OUTPATIENT)
Dept: FAMILY MEDICINE | Facility: CLINIC | Age: 50
End: 2020-03-13

## 2020-03-13 DIAGNOSIS — I10 HYPERTENSION, UNSPECIFIED TYPE: ICD-10-CM

## 2020-03-15 RX ORDER — OLMESARTAN MEDOXOMIL AND HYDROCHLOROTHIAZIDE 40/25 40; 25 MG/1; MG/1
1 TABLET ORAL DAILY
Qty: 30 TABLET | Refills: 0 | Status: SHIPPED | OUTPATIENT
Start: 2020-03-15 | End: 2020-04-30

## 2020-03-15 RX ORDER — OLMESARTAN MEDOXOMIL AND HYDROCHLOROTHIAZIDE 40/25 40; 25 MG/1; MG/1
TABLET ORAL
Qty: 30 TABLET | Refills: 0 | OUTPATIENT
Start: 2020-03-15

## 2020-04-24 ENCOUNTER — OFFICE VISIT (OUTPATIENT)
Dept: FAMILY MEDICINE | Facility: CLINIC | Age: 50
End: 2020-04-24
Payer: COMMERCIAL

## 2020-04-24 DIAGNOSIS — E88.819 INSULIN RESISTANCE: ICD-10-CM

## 2020-04-24 DIAGNOSIS — I10 ESSENTIAL HYPERTENSION: Primary | ICD-10-CM

## 2020-04-24 DIAGNOSIS — E78.5 HYPERLIPIDEMIA, UNSPECIFIED HYPERLIPIDEMIA TYPE: ICD-10-CM

## 2020-04-24 DIAGNOSIS — E55.9 VITAMIN D DEFICIENCY DISEASE: ICD-10-CM

## 2020-04-24 DIAGNOSIS — G47.33 OSA (OBSTRUCTIVE SLEEP APNEA): ICD-10-CM

## 2020-04-24 DIAGNOSIS — R73.03 PRE-DIABETES: ICD-10-CM

## 2020-04-24 PROCEDURE — 99214 PR OFFICE/OUTPT VISIT, EST, LEVL IV, 30-39 MIN: ICD-10-PCS | Mod: 95,,, | Performed by: FAMILY MEDICINE

## 2020-04-24 PROCEDURE — 99214 OFFICE O/P EST MOD 30 MIN: CPT | Mod: 95,,, | Performed by: FAMILY MEDICINE

## 2020-04-24 RX ORDER — METFORMIN HYDROCHLORIDE 500 MG/1
TABLET ORAL
Qty: 180 TABLET | Refills: 0 | Status: SHIPPED | OUTPATIENT
Start: 2020-04-24 | End: 2022-01-18

## 2020-04-24 NOTE — PROGRESS NOTES
Aliza Sagastume    Chief Complaint   Patient presents with    Hypertension    Follow-up       History of Present Illness:   The patient location is: Home (during Covid-19 pandemic)  The chief complaint leading to consultation is: Hypertension follow up  Visit type: audiovisual  Total time spent with patient: 22 minutes  Each patient to whom he or she provides medical services by telemedicine is:  (1) informed of the relationship between the physician and patient and the respective role of any other health care provider with respect to management of the patient; and (2) notified that he or she may decline to receive medical services by telemedicine and may withdraw from such care at any time.    Notes:     Ms. Sagastume visits with me today for 6-month hypertension follow-up.  She states she occasionally performs home blood pressure checks with okay levels noted.  She states she has not been exercising or monitoring her diet since COVID-19 restrictions.  As such, she states she has gained weight.      She states she has not taken metformin for treatment of insulin resistance syndrome/prediabetes in some time; she states she on takes vitamin-D weekly p.r.n. for treatment of low vitamin-D.    She complains of having slight shortness of breath with walking long distances.  She states she occasionally has hand cramping.  She also reports having back, knee and ankle pains with gaining weight.  She states she takes Flexeril and Ibuprofen with help.    She saw NP Lejeune for surveillance of SHAWNA on CPAP (which she states she has not been using lately) on April 1, 2019 with follow-up scheduled for May 4, 2020.    Otherwise, she denies having fever, chills, fatigue, appetite changes; cough, wheezing; chest pain, palpitations, leg swelling; abdominal pain, nausea, vomiting, diarrhea, constipation; unusual urinary symptoms; polydipsia, polyphagia, polyuria; headache, numbness; anxiety, depression, homicidal or suicidal  thoughts.          Labs:                    WBC                      7.20                07/25/2019                 HGB                      12.9                07/25/2019                 HCT                      42.7                07/25/2019                 PLT                      208                 07/25/2019                 CHOL                     197                 07/25/2019                 TRIG                     77                  07/25/2019                 HDL                      77 (H)              07/25/2019                 ALT                      9 (L)               07/25/2019                 AST                      14                  07/25/2019                 NA                       138                 07/25/2019                 K                        4.1                 07/25/2019                 CL                       103                 07/25/2019                 CREATININE               1.0                 07/25/2019                 BUN                      17                  07/25/2019                 CO2                      25                  07/25/2019                 TSH                      3.114               07/25/2019                 INR                      1.0                 07/30/2013                 HGBA1C                   6.1 (H)             07/25/2019              LDLCALC                  104.6               07/25/2019                Current Outpatient Medications   Medication Sig    cyclobenzaprine (FLEXERIL) 10 MG tablet TAKE 1 TABLET(10 MG) BY MOUTH EVERY EVENING AS NEEDED FOR MUSCLE SPASMS    ergocalciferol (ERGOCALCIFEROL) 50,000 unit Cap TAKE 1 CAPSULE BY MOUTH TWICE WEEKLY(ON MONDAY AND FRIDAY) PRN AS FORGETS TO TAKE IT    ibuprofen (ADVIL,MOTRIN) 800 MG tablet TAKE 1 TABLET(800 MG) BY MOUTH TWICE DAILY WITH MEALS    olmesartan-hydrochlorothiazide (BENICAR HCT) 40-25 mg per tablet Take 1 tablet by mouth once daily.    simvastatin (ZOCOR) 20 MG tablet  Take 1 tablet (20 mg total) by mouth every evening. TAKE 1 TABLET(20 MG) BY MOUTH EVERY EVENING    multivitamin capsule Take 1 capsule by mouth once daily.         Review of Systems   Constitutional: Negative for activity change, appetite change, chills, fatigue and fever.   Respiratory: Positive for shortness of breath. Negative for cough and wheezing.         See history of present illness.   Cardiovascular: Negative for chest pain, palpitations and leg swelling.   Gastrointestinal: Negative for abdominal pain, blood in stool, constipation, diarrhea, nausea and vomiting.   Endocrine: Negative for polydipsia, polyphagia and polyuria.        See history of present illness.   Genitourinary: Negative for difficulty urinating.   Musculoskeletal: Positive for arthralgias and back pain. Negative for gait problem.   Neurological: Negative for numbness and headaches.   Psychiatric/Behavioral: Negative for dysphoric mood and suicidal ideas. The patient is not nervous/anxious.         Negative for homicidal ideas.       Objective:  Physical Exam   Constitutional: She is oriented to person, place, and time. She appears well-developed and well-nourished. No distress.   Pleasant.   Pulmonary/Chest: Effort normal. No respiratory distress.   Musculoskeletal: Normal range of motion.   She moves around her home without problems during virtual visit.   Neurological: She is alert and oriented to person, place, and time.   Skin: She is not diaphoretic.   Psychiatric: She has a normal mood and affect. Her behavior is normal. Judgment and thought content normal.       ASSESSMENT:  1. Essential hypertension    2. Hyperlipidemia, unspecified hyperlipidemia type    3. Pre-diabetes    4. Insulin resistance    5. Vitamin D deficiency disease    6. SHAWNA (obstructive sleep apnea)        PLAN:  Aliza was seen today for hypertension and follow-up.    Diagnoses and all orders for this visit:    Essential hypertension    Hyperlipidemia,  unspecified hyperlipidemia type    Pre-diabetes  -     metFORMIN (GLUCOPHAGE) 500 MG tablet; TAKE 1 TABLET(500 MG) BY MOUTH TWICE DAILY WITH A MEAL    Insulin resistance    Vitamin D deficiency disease    SHAWNA (obstructive sleep apnea)       No labs today.  Continue current medications (including get back on Metformin), follow low sodium, low cholesterol, low carb diet, daily walks.  Prescription refill as noted above.  Encouraged patient to drink more water.  Keep follow up with specialist.  Flu shot this fall.  Follow up in about 3 months (around 7/27/2020) for physical.

## 2020-04-30 DIAGNOSIS — I10 HYPERTENSION, UNSPECIFIED TYPE: ICD-10-CM

## 2020-04-30 DIAGNOSIS — E55.9 VITAMIN D DEFICIENCY DISEASE: ICD-10-CM

## 2020-04-30 DIAGNOSIS — M25.569 KNEE PAIN, UNSPECIFIED CHRONICITY, UNSPECIFIED LATERALITY: ICD-10-CM

## 2020-04-30 RX ORDER — ERGOCALCIFEROL 1.25 MG/1
CAPSULE ORAL
Qty: 25 CAPSULE | Refills: 0 | Status: SHIPPED | OUTPATIENT
Start: 2020-04-30 | End: 2020-12-15

## 2020-04-30 RX ORDER — OLMESARTAN MEDOXOMIL AND HYDROCHLOROTHIAZIDE 40/25 40; 25 MG/1; MG/1
TABLET ORAL
Qty: 30 TABLET | Refills: 2 | Status: SHIPPED | OUTPATIENT
Start: 2020-04-30 | End: 2020-12-16

## 2020-04-30 RX ORDER — IBUPROFEN 800 MG/1
TABLET ORAL
Qty: 60 TABLET | Refills: 1 | Status: SHIPPED | OUTPATIENT
Start: 2020-04-30 | End: 2020-12-02 | Stop reason: ALTCHOICE

## 2020-05-01 ENCOUNTER — TELEPHONE (OUTPATIENT)
Dept: PULMONOLOGY | Facility: CLINIC | Age: 50
End: 2020-05-01

## 2020-05-01 ENCOUNTER — PATIENT OUTREACH (OUTPATIENT)
Dept: ADMINISTRATIVE | Facility: OTHER | Age: 50
End: 2020-05-01

## 2020-05-04 ENCOUNTER — TELEPHONE (OUTPATIENT)
Dept: PULMONOLOGY | Facility: CLINIC | Age: 50
End: 2020-05-04

## 2020-05-27 ENCOUNTER — PATIENT OUTREACH (OUTPATIENT)
Dept: ADMINISTRATIVE | Facility: OTHER | Age: 50
End: 2020-05-27

## 2020-05-28 ENCOUNTER — TELEPHONE (OUTPATIENT)
Dept: PULMONOLOGY | Facility: CLINIC | Age: 50
End: 2020-05-28

## 2020-05-29 ENCOUNTER — OFFICE VISIT (OUTPATIENT)
Dept: PULMONOLOGY | Facility: CLINIC | Age: 50
End: 2020-05-29
Payer: COMMERCIAL

## 2020-05-29 VITALS — HEIGHT: 66 IN | BODY MASS INDEX: 47.09 KG/M2 | WEIGHT: 293 LBS

## 2020-05-29 DIAGNOSIS — G47.33 OSA (OBSTRUCTIVE SLEEP APNEA): Primary | ICD-10-CM

## 2020-05-29 PROCEDURE — 99213 PR OFFICE/OUTPT VISIT, EST, LEVL III, 20-29 MIN: ICD-10-PCS | Mod: 95,,, | Performed by: NURSE PRACTITIONER

## 2020-05-29 PROCEDURE — 99213 OFFICE O/P EST LOW 20 MIN: CPT | Mod: 95,,, | Performed by: NURSE PRACTITIONER

## 2020-05-29 NOTE — PROGRESS NOTES
"Subjective:      Patient ID: Aliza Sagastume is a 49 y.o. female.    Chief Complaint: Sleep Apnea  The patient location is: Home  The chief complaint leading to consultation is: sleep  Visit type: Virtual visit with synchronous audio and video  Total time spent with patient: 10 min   Each patient to whom he or she provides medical services by telemedicine is:  (1) informed of the relationship between the physician and patient and the respective role of any other health care provider with respect to management of the patient; and (2) notified that he or she may decline to receive medical services by telemedicine and may withdraw from such care at any time.    HPI  Present for sleep apnea on autopap. She has not worn her autopap in quite some time. She did benefit from use but hard time tolerating mask. She will call DME to try new mask.   She has gained a few pounds. BMI 60.     Patient Active Problem List   Diagnosis    HTN (hypertension)    Hyperlipidemia    Vitamin D deficiency disease    Insulin resistance    BMI 50.0-59.9, adult    Allergic rhinitis, cause unspecified    Urolithiasis    Goiter    PVD (peripheral vascular disease)    SHAWNA (obstructive sleep apnea)    LEAL (dyspnea on exertion)    Prediabetes       Ht 5' 6" (1.676 m)   Wt (!) 168.7 kg (372 lb)   BMI 60.04 kg/m²   Body mass index is 60.04 kg/m².    Review of Systems   Constitutional: Negative.    HENT: Negative.    Respiratory: Negative.    Cardiovascular: Negative.    Musculoskeletal: Negative.    Gastrointestinal: Negative.    Neurological: Negative.    Psychiatric/Behavioral: Positive for sleep disturbance.     Objective:      Physical Exam   Constitutional: She is oriented to person, place, and time. She appears well-developed and well-nourished.   HENT:   Head: Normocephalic and atraumatic.   Neck: Normal range of motion.   Pulmonary/Chest: Effort normal. No accessory muscle usage. No tachypnea and no bradypnea. No respiratory " distress.   Neurological: She is alert and oriented to person, place, and time.   Skin: No rash noted.   Psychiatric: She has a normal mood and affect. Her behavior is normal. Judgment and thought content normal.     Personal Diagnostic Review  Autopap. No data        Assessment:       1. SHAWNA (obstructive sleep apnea)    2. BMI 50.0-59.9, adult        Outpatient Encounter Medications as of 5/29/2020   Medication Sig Dispense Refill    cyclobenzaprine (FLEXERIL) 10 MG tablet TAKE 1 TABLET(10 MG) BY MOUTH EVERY EVENING AS NEEDED FOR MUSCLE SPASMS 30 tablet 1    ergocalciferol (ERGOCALCIFEROL) 50,000 unit Cap TAKE ONE CAPSULE BY MOUTH TWICE A WEEK( ON MONDAYS AND FRIDAYS) 25 capsule 0    ibuprofen (ADVIL,MOTRIN) 800 MG tablet TAKE 1 TABLET(800 MG) BY MOUTH TWICE DAILY WITH A MEAL 60 tablet 1    metFORMIN (GLUCOPHAGE) 500 MG tablet TAKE 1 TABLET(500 MG) BY MOUTH TWICE DAILY WITH A MEAL 180 tablet 0    multivitamin capsule Take 1 capsule by mouth once daily.      olmesartan-hydrochlorothiazide (BENICAR HCT) 40-25 mg per tablet TAKE 1 TABLET BY MOUTH EVERY DAY 30 tablet 2    simvastatin (ZOCOR) 20 MG tablet Take 1 tablet (20 mg total) by mouth every evening. TAKE 1 TABLET(20 MG) BY MOUTH EVERY EVENING 90 tablet 0     No facility-administered encounter medications on file as of 5/29/2020.      Orders Placed This Encounter   Procedures    CPAP/BIPAP SUPPLIES     90 day supply with 3 refills.     Order Specific Question:   Type of mask:     Answer:   FFM     Order Specific Question:   Headgear?     Answer:   Yes     Order Specific Question:   Tubing?     Answer:   Yes     Order Specific Question:   Humidifier chamber?     Answer:   Yes     Order Specific Question:   Chin strap?     Answer:   Yes     Order Specific Question:   Filters?     Answer:   Yes     Order Specific Question:   Cushions?     Answer:   Yes     Order Specific Question:   Length of need (1-99 months):     Answer:   99     Plan:        Problem List  Items Addressed This Visit        Endocrine    BMI 50.0-59.9, adult    Overview     Weight loss and exercise to improve overall health.              Other    SHAWNA (obstructive sleep apnea) - Primary    Current Assessment & Plan     Start back on therapy. New mask fit         Relevant Orders    CPAP/BIPAP SUPPLIES      Weight loss and exercise to improve overall health.

## 2020-05-29 NOTE — PATIENT INSTRUCTIONS
[facility]   Toll free 24 hr line for assistance: [phone]   Option 1: CPAP  (press 1 - supplies ([employer]), press 2 questions regarding your machine)  Option 2: Oxygen, Nebulizer, Ventilator  Option 3: service  Option 4: Discharge (, providers, nurses)  Option 5: Diabetics  Option 6: Ortho (ortho braces, walker, wheelchairs, etc)  Option 7: Billing

## 2020-07-16 DIAGNOSIS — E78.5 HYPERLIPIDEMIA, UNSPECIFIED HYPERLIPIDEMIA TYPE: Primary | ICD-10-CM

## 2020-07-17 ENCOUNTER — HOSPITAL ENCOUNTER (OUTPATIENT)
Dept: CARDIOLOGY | Facility: HOSPITAL | Age: 50
Discharge: HOME | End: 2020-07-17
Attending: INTERNAL MEDICINE
Payer: COMMERCIAL

## 2020-07-17 ENCOUNTER — OFFICE VISIT (OUTPATIENT)
Dept: CARDIOLOGY | Facility: CLINIC | Age: 50
End: 2020-07-17
Payer: COMMERCIAL

## 2020-07-17 VITALS
DIASTOLIC BLOOD PRESSURE: 68 MMHG | HEART RATE: 89 BPM | SYSTOLIC BLOOD PRESSURE: 112 MMHG | BODY MASS INDEX: 59.99 KG/M2 | WEIGHT: 293 LBS | OXYGEN SATURATION: 98 %

## 2020-07-17 DIAGNOSIS — E78.5 HYPERLIPIDEMIA, UNSPECIFIED HYPERLIPIDEMIA TYPE: ICD-10-CM

## 2020-07-17 DIAGNOSIS — E78.2 MIXED HYPERLIPIDEMIA: ICD-10-CM

## 2020-07-17 DIAGNOSIS — G47.33 OSA (OBSTRUCTIVE SLEEP APNEA): ICD-10-CM

## 2020-07-17 DIAGNOSIS — I73.9 PVD (PERIPHERAL VASCULAR DISEASE): ICD-10-CM

## 2020-07-17 DIAGNOSIS — I10 ESSENTIAL HYPERTENSION: Primary | ICD-10-CM

## 2020-07-17 PROCEDURE — 99213 OFFICE O/P EST LOW 20 MIN: CPT | Mod: S$GLB,,, | Performed by: INTERNAL MEDICINE

## 2020-07-17 PROCEDURE — 99213 PR OFFICE/OUTPT VISIT, EST, LEVL III, 20-29 MIN: ICD-10-PCS | Mod: S$GLB,,, | Performed by: INTERNAL MEDICINE

## 2020-07-17 PROCEDURE — 99999 PR PBB SHADOW E&M-EST. PATIENT-LVL III: CPT | Mod: PBBFAC,,, | Performed by: INTERNAL MEDICINE

## 2020-07-17 PROCEDURE — 93010 ELECTROCARDIOGRAM REPORT: CPT | Mod: ,,, | Performed by: INTERNAL MEDICINE

## 2020-07-17 PROCEDURE — 93010 EKG 12-LEAD: ICD-10-PCS | Mod: ,,, | Performed by: INTERNAL MEDICINE

## 2020-07-17 PROCEDURE — 99999 PR PBB SHADOW E&M-EST. PATIENT-LVL III: ICD-10-PCS | Mod: PBBFAC,,, | Performed by: INTERNAL MEDICINE

## 2020-07-17 PROCEDURE — 93005 ELECTROCARDIOGRAM TRACING: CPT

## 2020-07-17 NOTE — PROGRESS NOTES
Subjective:   Patient ID:  Aliza Sagastume is a 49 y.o. female who presents for follow up of No chief complaint on file.      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  Improved fatigue.  No chest pain.  LEAL if walking fast. Has knee pain while walking  Ankle well at night and better in morning.   EKG NSR  No smoking and occasional drinking  Father 1st heart attack at 60. Mother HTN. No f/h premature CAD.   ECHO normal function' ETT METS 4 , high BP and no ischemia.  BP LDL and BS controlled      Past Medical History:   Diagnosis Date    CEDRICK positive 08/2017    High cholesterol     History of vitamin D deficiency     Hypertension     Hypothyroidism     Insulin resistance 9/12/2013    Kidney stones     Pre-diabetes     Snoring        Past Surgical History:   Procedure Laterality Date    CYSTOSCOPY W/ LASER LITHOTRIPSY      x 2    DILATION AND CURETTAGE OF UTERUS      x1 for abnormal bleeding    UTERINE FIBROID SURGERY  2019       Social History     Tobacco Use    Smoking status: Never Smoker    Smokeless tobacco: Never Used   Substance Use Topics    Alcohol use: Yes     Alcohol/week: 0.0 standard drinks     Frequency: Monthly or less     Drinks per session: 1 or 2     Binge frequency: Never     Comment: occasionally    Drug use: No       Family History   Problem Relation Age of Onset    Hypertension Mother     Heart disease Father 69        MI    Hypertension Father     Diabetes Father     Cancer Father         prostate cancer    Hypertension Sister     Cancer Paternal Aunt         Brain cancer    Cancer Paternal Uncle         Pancreas cancer    Hypertension Maternal Grandmother     Hypertension Maternal Grandfather     Stroke Maternal Grandfather 86    Hypertension Paternal Grandmother     Hypertension Paternal Grandfather     No Known Problems Daughter     No Known Problems Daughter     Cancer Cousin          Breast cancer    Breast cancer Paternal Cousin          Review of Systems   Constitution: Positive for malaise/fatigue. Negative for decreased appetite, diaphoresis, fever and night sweats.   HENT: Negative for nosebleeds.    Eyes: Negative for blurred vision and double vision.   Cardiovascular: Positive for dyspnea on exertion. Negative for chest pain, claudication, irregular heartbeat, leg swelling, near-syncope, orthopnea, palpitations, paroxysmal nocturnal dyspnea and syncope.   Respiratory: Positive for snoring. Negative for cough, shortness of breath, sleep disturbances due to breathing, sputum production and wheezing.    Endocrine: Negative for cold intolerance and polyuria.   Hematologic/Lymphatic: Does not bruise/bleed easily.   Skin: Negative for rash.   Musculoskeletal: Negative for back pain, falls, joint pain, joint swelling and neck pain.   Gastrointestinal: Negative for abdominal pain, heartburn, nausea and vomiting.   Genitourinary: Negative for dysuria, frequency and hematuria.   Neurological: Negative for difficulty with concentration, dizziness, focal weakness, headaches, light-headedness, numbness, seizures and weakness.   Psychiatric/Behavioral: Negative for depression, memory loss and substance abuse. The patient does not have insomnia.    Allergic/Immunologic: Negative for HIV exposure and hives.       Objective:   Physical Exam   Constitutional: She is oriented to person, place, and time. She appears well-nourished.   HENT:   Head: Normocephalic.   Eyes: Pupils are equal, round, and reactive to light.   Neck: Normal carotid pulses and no JVD present. Carotid bruit is not present. No thyromegaly present.   Cardiovascular: Normal rate, regular rhythm, normal heart sounds and normal pulses.  No extrasystoles are present. PMI is not displaced. Exam reveals no gallop and no S3.   No murmur heard.  Pulmonary/Chest: Breath sounds normal. No stridor. No respiratory distress.   Abdominal: Soft. Bowel  sounds are normal. There is no abdominal tenderness. There is no rebound.   Musculoskeletal: Normal range of motion.   Neurological: She is alert and oriented to person, place, and time.   Skin: Skin is intact. No rash noted.   Psychiatric: Her behavior is normal.       Lab Results   Component Value Date    CHOL 197 07/25/2019    CHOL 211 (H) 07/25/2018    CHOL 212 (H) 08/28/2017     Lab Results   Component Value Date    HDL 77 (H) 07/25/2019    HDL 72 07/25/2018    HDL 70 08/28/2017     Lab Results   Component Value Date    LDLCALC 104.6 07/25/2019    LDLCALC 124.6 07/25/2018    LDLCALC 127.0 08/28/2017     Lab Results   Component Value Date    TRIG 77 07/25/2019    TRIG 72 07/25/2018    TRIG 75 08/28/2017     Lab Results   Component Value Date    CHOLHDL 39.1 07/25/2019    CHOLHDL 34.1 07/25/2018    CHOLHDL 33.0 08/28/2017       Chemistry        Component Value Date/Time     07/25/2019 0840    K 4.1 07/25/2019 0840     07/25/2019 0840    CO2 25 07/25/2019 0840    BUN 17 07/25/2019 0840    CREATININE 1.0 07/25/2019 0840    GLU 97 07/25/2019 0840        Component Value Date/Time    CALCIUM 9.6 07/25/2019 0840    ALKPHOS 75 07/25/2019 0840    AST 14 07/25/2019 0840    ALT 9 (L) 07/25/2019 0840    BILITOT 0.3 07/25/2019 0840    ESTGFRAFRICA >60.0 07/25/2019 0840    EGFRNONAA >60.0 07/25/2019 0840          Lab Results   Component Value Date    HGBA1C 6.1 (H) 07/25/2019     Lab Results   Component Value Date    TSH 3.114 07/25/2019     Lab Results   Component Value Date    INR 1.0 07/30/2013     Lab Results   Component Value Date    WBC 7.20 07/25/2019    HGB 12.9 07/25/2019    HCT 42.7 07/25/2019    MCV 84 07/25/2019     07/25/2019     BMP  Sodium   Date Value Ref Range Status   07/25/2019 138 136 - 145 mmol/L Final     Potassium   Date Value Ref Range Status   07/25/2019 4.1 3.5 - 5.1 mmol/L Final     Chloride   Date Value Ref Range Status   07/25/2019 103 95 - 110 mmol/L Final     CO2   Date Value  Ref Range Status   07/25/2019 25 23 - 29 mmol/L Final     BUN, Bld   Date Value Ref Range Status   07/25/2019 17 6 - 20 mg/dL Final     Creatinine   Date Value Ref Range Status   07/25/2019 1.0 0.5 - 1.4 mg/dL Final     Calcium   Date Value Ref Range Status   07/25/2019 9.6 8.7 - 10.5 mg/dL Final     Anion Gap   Date Value Ref Range Status   07/25/2019 10 8 - 16 mmol/L Final     eGFR if    Date Value Ref Range Status   07/25/2019 >60.0 >60 mL/min/1.73 m^2 Final     eGFR if non    Date Value Ref Range Status   07/25/2019 >60.0 >60 mL/min/1.73 m^2 Final     Comment:     Calculation used to obtain the estimated glomerular filtration  rate (eGFR) is the CKD-EPI equation.        BNP  @LABRCNTIP(BNP,BNPTRIAGEBLO)@  @LABRCNTIP(troponini)@  CrCl cannot be calculated (Patient's most recent lab result is older than the maximum 7 days allowed.).  No results found in the last 24 hours.  No results found in the last 24 hours.  No results found in the last 24 hours.    Assessment:      1. Essential hypertension    2. Mixed hyperlipidemia    3. PVD (peripheral vascular disease)    4. BMI 50.0-59.9, adult    5. SHAWNA (obstructive sleep apnea)        Plan:   Continue ARB/HCT  Counseled DASH  Check Lipid profile in 6 months  Recommend heart-healthy diet, weight control and regular exercise.  Oh. Risk modification.   RTC in 1 yr    I have reviewed all pertinent labs and cardiac studies independently. Plans and recommendations have been formulated under my direct supervision. All questions answered and patient voiced understanding. Patient to continue current medications.   Return sooner for concerns or questions.   If symptoms persist go to the ED

## 2020-08-04 ENCOUNTER — OFFICE VISIT (OUTPATIENT)
Dept: FAMILY MEDICINE | Facility: CLINIC | Age: 50
End: 2020-08-04
Payer: COMMERCIAL

## 2020-08-04 VITALS
SYSTOLIC BLOOD PRESSURE: 124 MMHG | HEART RATE: 124 BPM | DIASTOLIC BLOOD PRESSURE: 80 MMHG | WEIGHT: 293 LBS | TEMPERATURE: 98 F | RESPIRATION RATE: 16 BRPM | OXYGEN SATURATION: 97 % | HEIGHT: 66 IN | BODY MASS INDEX: 47.09 KG/M2

## 2020-08-04 DIAGNOSIS — Z12.11 SCREENING FOR COLON CANCER: ICD-10-CM

## 2020-08-04 DIAGNOSIS — E78.2 MIXED HYPERLIPIDEMIA: ICD-10-CM

## 2020-08-04 DIAGNOSIS — M25.569 KNEE PAIN, UNSPECIFIED CHRONICITY, UNSPECIFIED LATERALITY: ICD-10-CM

## 2020-08-04 DIAGNOSIS — E66.01 MORBID OBESITY WITH BMI OF 60.0-69.9, ADULT: ICD-10-CM

## 2020-08-04 DIAGNOSIS — E55.9 VITAMIN D DEFICIENCY DISEASE: ICD-10-CM

## 2020-08-04 DIAGNOSIS — G47.33 OSA (OBSTRUCTIVE SLEEP APNEA): ICD-10-CM

## 2020-08-04 DIAGNOSIS — N95.1 PERIMENOPAUSAL: ICD-10-CM

## 2020-08-04 DIAGNOSIS — E88.819 INSULIN RESISTANCE: ICD-10-CM

## 2020-08-04 DIAGNOSIS — Z00.00 ANNUAL PHYSICAL EXAM: Primary | ICD-10-CM

## 2020-08-04 DIAGNOSIS — R73.03 PREDIABETES: ICD-10-CM

## 2020-08-04 DIAGNOSIS — I10 ESSENTIAL HYPERTENSION: ICD-10-CM

## 2020-08-04 DIAGNOSIS — I73.9 PVD (PERIPHERAL VASCULAR DISEASE): ICD-10-CM

## 2020-08-04 PROCEDURE — 99396 PR PREVENTIVE VISIT,EST,40-64: ICD-10-PCS | Mod: S$GLB,,, | Performed by: FAMILY MEDICINE

## 2020-08-04 PROCEDURE — 99999 PR PBB SHADOW E&M-EST. PATIENT-LVL IV: ICD-10-PCS | Mod: PBBFAC,,, | Performed by: FAMILY MEDICINE

## 2020-08-04 PROCEDURE — 99396 PREV VISIT EST AGE 40-64: CPT | Mod: S$GLB,,, | Performed by: FAMILY MEDICINE

## 2020-08-04 PROCEDURE — 99999 PR PBB SHADOW E&M-EST. PATIENT-LVL IV: CPT | Mod: PBBFAC,,, | Performed by: FAMILY MEDICINE

## 2020-08-04 RX ORDER — DICLOFENAC SODIUM 10 MG/G
2 GEL TOPICAL 4 TIMES DAILY PRN
Qty: 100 G | Refills: 1 | Status: SHIPPED | OUTPATIENT
Start: 2020-08-04

## 2020-08-04 NOTE — LETTER
August 4, 2020    Aliza Sagastume  [facility] [place] [address]             [facility]  Family Medicine  [address] [place] [address]  Phone: [phone]  Fax: [phone]   August 4, 2020     Patient: Aliza Sagastume   YOB: 1970   Date of Visit: 8/4/2020       To Whom it May Concern:    Aliza Sagastume was seen in my clinic on 8/4/2020. She may return to work on 08/05/2020.    Please excuse her from any classes or work missed.    If you have any questions or concerns, please don't hesitate to call.    Sincerely,         Kayla Bradshaw MD

## 2020-08-04 NOTE — PROGRESS NOTES
HISTORY OF PRESENT ILLNESS: Ms. Sagastume who comes in today for annual wellness examination. She is not fasting and has not taken medication today.      END OF LIFE DECISION: She does not have a living will and does desire life support.    Current Outpatient Medications   Medication Sig    cyclobenzaprine (FLEXERIL) 10 MG tablet TAKE 1 TABLET(10 MG) BY MOUTH EVERY EVENING AS NEEDED FOR MUSCLE SPASMS    ergocalciferol (ERGOCALCIFEROL) 50,000 unit Cap TAKE ONE CAPSULE BY MOUTH TWICE A WEEK( ON MONDAYS AND FRIDAYS)    ibuprofen (ADVIL,MOTRIN) 800 MG tablet TAKE 1 TABLET(800 MG) BY MOUTH TWICE DAILY WITH A MEAL    metFORMIN (GLUCOPHAGE) 500 MG tablet TAKE 1 TABLET(500 MG) BY MOUTH TWICE DAILY WITH A MEAL    multivitamin capsule Take 1 capsule by mouth once daily.    olmesartan-hydrochlorothiazide (BENICAR HCT) 40-25 mg per tablet TAKE 1 TABLET BY MOUTH EVERY DAY    simvastatin (ZOCOR) 20 MG tablet TAKE 1 TABLET(20 MG) BY MOUTH EVERY EVENING     SCREENINGS:   Cholesterol: July 25, 2019.  FFS/Colonoscopy: Never. Desires FIT-KIT.  Mammogram: August 14, 2018 - okay.  GYN Exam (breast): July 25, 2018 - okay. She states she saw GYN 2 years ago for pap smear - okay per patient. She states she will schedule this year.  Dexa Scan: Never.  Eye Exam: Never. 2 Years ago per patient.  She wears reading glasses for the computer.  HCVAb: Never. She desires.  HIVAb: Never. She declines.  PPD: Never.  Immunizations: Td/Tdap - more than 10 years ago; she declines.  Gardasil - N./A.  Zostavax - N./A.  Shingrix - Never. Reports no history of varicella or zoster. She desires lab to check as exposed in the past.  Pneumovax - never.  Seasonal Flu - She declines.      ROS:  GENERAL: Denies fever, chills, fatigue. Appetite normal. Weight 166 kg (366 lb 1.2 oz) at July 25, 2019 visit. Not currently exercises. Monitors diet some times.  SKIN: Denies rashes, itching, changes in mole, color or texture of skin or easy bruising.  HEAD:  Denies  headaches or recent head trauma.  EYES: Denies change in vision, pain, diplopia, redness or discharge.  EARS: Denies ear pain, discharge, vertigo or decreased hearing.  NOSE: Denies loss of smell, epistaxis or rhinitis.  MOUTH & THROAT: Denies hoarseness or change in voice. Denies excessive gum bleeding or mouth sores.  Denies sore throat.  NODES: Denies swollen glands.  CHEST: Denies wheezing, cough, or sputum production. Saw NP Lejeune with pulmonary on May 29, [id number] for SHAWNA on CPAP with 1-year follow up advised. Reports shortness of breath with walking. Does not use CPAP as should.  CARDIOVASCULAR: Denies chest pain, PND, orthopnea or reduced exercise tolerance.  Denies palpitations. Saw cardiologist Dr. Mancini on July 17, 2020 for LEAL, hypertension, hyperlipidemia, PVD, SHAWNA with 1-year follow up advised.    ABDOMEN: Denies diarrhea, constipation, nausea, vomiting, abdominal pain, or blood in stool.  URINARY: Denies flank pain, dysuria or hematuria.  GENITOURINARY: Denies flank pain, dysuria, frequency or hematuria. No change in menses. Does not perform monthly breast self examination.  ENDOCRINE: Currently taking cholesterol-lowering medication and vitamin D 50K units 2 times per week and Metformin for pre-diabetes.  HEME/LYMPH: Denies bleeding problems. Reports receives B12 shots every other week from [facility] and states shots help with her energy level; however, reports not received since Covid-19 pandemic/restrictions.  PERIPHERAL VASCULAR:Denies claudication or cyanosis.  MUSCULOSKELETAL: Denies joint stiffness, pain or swelling. Denies edema. Reports occasional knee(s) and back pain due to arthritis and takes Ibuprofen daily with help; desires topical therapy.   NEUROLOGIC: Denies history of seizures, tremors, paralysis, alteration of gait or coordination.  PSYCHIATRIC: Denies mood swings, depression, anxiety, homicidal or suicidal thoughts. Reports improved sleeping issues.    PE:   VS: /80    "Pulse (!) 124   Temp 98.2 °F (36.8 °C) (Temporal)   Resp 16   Ht 5' 6" (1.676 m)   Wt (!) 171.5 kg (378 lb 1.4 oz)   LMP  (LMP Unknown)   SpO2 97%   BMI 61.03 kg/m²   APPEARANCE: Well nourished, well developed female, obese and pleasant, alert and oriented in no acute distress.   HEAD: Nontender. Full range of motion.  EYES: PERRL, conjunctiva pink, lids no edema.  EARS: External canal patent, no swelling or redness. TM's shiny and clear.  NOSE: Mucosa and turbinates pink, not swollen. No discharge. Nontender sinuses.  THROAT: No pharyngeal erythema or exudate. No stridor.   NECK: Supple, no mass, non tender, no thyroid enlargement.  NODES: No cervical lymph node enlargement.  CHEST: Normal respiratory effort. Lungs clear to auscultation.  CARDIOVASCULAR: Normal S1, S2. No rubs, murmurs or gallops. PMI not displaced. No carotid bruit. Pedal pulses palpable bilaterally. No edema.  ABDOMEN: Bowel sounds present. Not distended. Soft. No tenderness, masses or organomegaly.  BREAST EXAM: Not examined as patient declines.  PELVIC EXAM:  Not examined as patient declines.   RECTAL EXAM: Not examined as patient declines.  MUSCULOSKELETAL: No joint deformities or stiffness. She is ambulatory without problems. Non tender knee(s) and back with full range of motion noted.  SKIN: No rashes or suspicious lesions, normal color and turgor.  NEUROLOGIC: Cranial Nerves: II-XII grossly intact. DTR's: Knees, Ankles 2+ and equal bilaterally. Gait & Posture: Normal gait and fine motion.  PSYCHIATRIC: Patient alert, oriented x 3. Mood/Affect normal without acute anxiety or depression noted. Judgment/insight good as she makes appropriate decisions on today's examination.      ASSESSMENT:    ICD-10-CM ICD-9-CM    1. Annual physical exam  Z00.00 V70.0 CBC auto differential      TSH      Urinalysis      Comprehensive metabolic panel      Lipid Panel      Insulin, random      Hemoglobin A1C      Vitamin D      Vitamin B12      Hepatitis " C Antibody      Varicella Zoster Antibody, IgG   2. Essential hypertension  I10 401.9    3. Mixed hyperlipidemia  E78.2 272.2    4. PVD (peripheral vascular disease)  I73.9 443.9    5. Prediabetes  R73.03 790.29    6. Insulin resistance  E88.81 277.7    7. Vitamin D deficiency disease  E55.9 268.9    8. SHAWNA (obstructive sleep apnea)  G47.33 327.23    9. Knee pain, unspecified chronicity, unspecified laterality  M25.569 719.46 diclofenac sodium (VOLTAREN) 1 % Gel   10. Morbid obesity with BMI of 60.0-69.9, adult  E66.01 278.01     Z68.44 V85.44    11. Perimenopausal  N95.1 627.2    12. Screening for colon cancer  Z12.11 V76.51 Fecal Immunochemical Test (iFOBT)       PLAN:  1. Age-appropriate counseling-appropriate low-sodium, low-cholesterol, low carbohydrate diet and exercise daily, monthly breast self exam, annual wellness examination.   2. Patient advised to call for results.  3. Continue current medications.  4. Add Voltaren gel for arthritis as directed; medication precautions discussed with patient.  5. Keep follow up with specialists.                        6. Follow up if symptoms worsen or fail to improve.    Answers for HPI/ROS submitted by the patient on 8/4/2020   activity change: Yes  unexpected weight change: Yes  neck pain: No  hearing loss: No  rhinorrhea: No  trouble swallowing: No  eye discharge: Yes  visual disturbance: No  chest tightness: No  wheezing: No  chest pain: No  palpitations: No  blood in stool: No  constipation: No  vomiting: No  diarrhea: No  polydipsia: No  polyuria: No  difficulty urinating: No  hematuria: No  menstrual problem: No  dysuria: No  joint swelling: No  arthralgias: Yes  headaches: No  weakness: No  confusion: No  dysphoric mood: No

## 2020-08-11 ENCOUNTER — HOSPITAL ENCOUNTER (OUTPATIENT)
Dept: RADIOLOGY | Facility: HOSPITAL | Age: 50
Discharge: HOME | End: 2020-08-11
Attending: FAMILY MEDICINE
Payer: COMMERCIAL

## 2020-08-11 VITALS — HEIGHT: 66 IN | WEIGHT: 293 LBS | BODY MASS INDEX: 47.09 KG/M2

## 2020-08-11 DIAGNOSIS — Z12.31 VISIT FOR SCREENING MAMMOGRAM: ICD-10-CM

## 2020-08-11 PROCEDURE — 77067 MAMMO DIGITAL SCREENING BILAT WITH CAD: ICD-10-PCS | Mod: 26,,, | Performed by: RADIOLOGY

## 2020-08-11 PROCEDURE — 77067 SCR MAMMO BI INCL CAD: CPT | Mod: 26,,, | Performed by: RADIOLOGY

## 2020-08-11 PROCEDURE — 77067 SCR MAMMO BI INCL CAD: CPT | Mod: TC

## 2020-11-06 ENCOUNTER — TELEPHONE (OUTPATIENT)
Dept: FAMILY MEDICINE | Facility: CLINIC | Age: 50
End: 2020-11-06

## 2020-11-06 ENCOUNTER — OFFICE VISIT (OUTPATIENT)
Dept: FAMILY MEDICINE | Facility: CLINIC | Age: 50
End: 2020-11-06
Payer: COMMERCIAL

## 2020-11-06 VITALS
OXYGEN SATURATION: 99 % | DIASTOLIC BLOOD PRESSURE: 80 MMHG | SYSTOLIC BLOOD PRESSURE: 140 MMHG | HEART RATE: 115 BPM | TEMPERATURE: 97 F | BODY MASS INDEX: 47.09 KG/M2 | HEIGHT: 66 IN | WEIGHT: 293 LBS

## 2020-11-06 DIAGNOSIS — Z23 NEED FOR SHINGLES VACCINE: ICD-10-CM

## 2020-11-06 DIAGNOSIS — Z53.21 PATIENT LEFT BEFORE EVALUATION BY PHYSICIAN: Primary | ICD-10-CM

## 2020-11-06 DIAGNOSIS — R82.90 ABNORMAL URINE: Primary | ICD-10-CM

## 2020-11-06 PROCEDURE — 99499 UNLISTED E&M SERVICE: CPT | Mod: S$GLB,,, | Performed by: FAMILY MEDICINE

## 2020-11-06 PROCEDURE — 99999 PR PBB SHADOW E&M-EST. PATIENT-LVL III: CPT | Mod: PBBFAC,,, | Performed by: FAMILY MEDICINE

## 2020-11-06 PROCEDURE — 99499 NO LOS: ICD-10-PCS | Mod: S$GLB,,, | Performed by: FAMILY MEDICINE

## 2020-11-06 PROCEDURE — 99999 PR PBB SHADOW E&M-EST. PATIENT-LVL III: ICD-10-PCS | Mod: PBBFAC,,, | Performed by: FAMILY MEDICINE

## 2020-11-06 NOTE — TELEPHONE ENCOUNTER
Please advise pt I'm sorry for her wait today.  Please give me details of symptoms/concerns today so I can make recommendations.    Also, advise pt her lab results showed she has have been exposed to chicken pox in the past and are eligible to receive new shingles shots; also, I would like her to repeat urine as was abnormal; urine order in.  Thanks.

## 2020-11-13 ENCOUNTER — TELEPHONE (OUTPATIENT)
Dept: FAMILY MEDICINE | Facility: CLINIC | Age: 50
End: 2020-11-13

## 2020-11-13 DIAGNOSIS — M25.561 RIGHT KNEE PAIN, UNSPECIFIED CHRONICITY: Primary | ICD-10-CM

## 2020-11-17 ENCOUNTER — TELEPHONE (OUTPATIENT)
Dept: ORTHOPEDICS | Facility: CLINIC | Age: 50
End: 2020-11-17

## 2020-11-18 ENCOUNTER — PATIENT OUTREACH (OUTPATIENT)
Dept: ADMINISTRATIVE | Facility: HOSPITAL | Age: 50
End: 2020-11-18

## 2020-11-18 ENCOUNTER — TELEPHONE (OUTPATIENT)
Dept: ORTHOPEDICS | Facility: CLINIC | Age: 50
End: 2020-11-18

## 2020-11-18 NOTE — PROGRESS NOTES
HTN Report. Attempting to contact pt to obtain a home BP reading or schedule a nurse visit to recheck BP . Unable to reach patient at this time. No answer

## 2020-11-20 ENCOUNTER — TELEPHONE (OUTPATIENT)
Dept: ORTHOPEDICS | Facility: CLINIC | Age: 50
End: 2020-11-20

## 2020-11-20 DIAGNOSIS — M25.561 RIGHT KNEE PAIN, UNSPECIFIED CHRONICITY: Primary | ICD-10-CM

## 2020-11-20 NOTE — TELEPHONE ENCOUNTER
Spoke to patient in regards to ortho appt. Informed patient to arrive 30 minutes prior for xrays/ Patient verbalized understanding. ms

## 2020-12-01 ENCOUNTER — PATIENT OUTREACH (OUTPATIENT)
Dept: ADMINISTRATIVE | Facility: OTHER | Age: 50
End: 2020-12-01

## 2020-12-01 DIAGNOSIS — Z12.11 ENCOUNTER FOR FIT (FECAL IMMUNOCHEMICAL TEST) SCREENING: Primary | ICD-10-CM

## 2020-12-02 ENCOUNTER — OFFICE VISIT (OUTPATIENT)
Dept: ORTHOPEDICS | Facility: CLINIC | Age: 50
End: 2020-12-02
Payer: COMMERCIAL

## 2020-12-02 ENCOUNTER — HOSPITAL ENCOUNTER (OUTPATIENT)
Dept: RADIOLOGY | Facility: HOSPITAL | Age: 50
Discharge: HOME | End: 2020-12-02
Attending: STUDENT IN AN ORGANIZED HEALTH CARE EDUCATION/TRAINING PROGRAM
Payer: COMMERCIAL

## 2020-12-02 ENCOUNTER — CLINICAL SUPPORT (OUTPATIENT)
Dept: REHABILITATION | Facility: HOSPITAL | Age: 50
End: 2020-12-02
Attending: STUDENT IN AN ORGANIZED HEALTH CARE EDUCATION/TRAINING PROGRAM
Payer: COMMERCIAL

## 2020-12-02 DIAGNOSIS — M25.561 RIGHT KNEE PAIN, UNSPECIFIED CHRONICITY: ICD-10-CM

## 2020-12-02 DIAGNOSIS — M17.0 BILATERAL PRIMARY OSTEOARTHRITIS OF KNEE: Primary | ICD-10-CM

## 2020-12-02 DIAGNOSIS — M25.662 DECREASED RANGE OF MOTION OF BOTH KNEES: Primary | ICD-10-CM

## 2020-12-02 DIAGNOSIS — M25.661 DECREASED RANGE OF MOTION OF BOTH KNEES: Primary | ICD-10-CM

## 2020-12-02 DIAGNOSIS — Z74.09 DECREASED STRENGTH, ENDURANCE, AND MOBILITY: ICD-10-CM

## 2020-12-02 DIAGNOSIS — M17.0 BILATERAL PRIMARY OSTEOARTHRITIS OF KNEE: ICD-10-CM

## 2020-12-02 DIAGNOSIS — R68.89 DECREASED STRENGTH, ENDURANCE, AND MOBILITY: ICD-10-CM

## 2020-12-02 DIAGNOSIS — R53.1 DECREASED STRENGTH, ENDURANCE, AND MOBILITY: ICD-10-CM

## 2020-12-02 PROCEDURE — 73564 X-RAY EXAM KNEE 4 OR MORE: CPT | Mod: 26,RT,, | Performed by: RADIOLOGY

## 2020-12-02 PROCEDURE — 73564 XR KNEE ORTHO RIGHT WITH FLEXION: ICD-10-PCS | Mod: 26,RT,, | Performed by: RADIOLOGY

## 2020-12-02 PROCEDURE — 97110 THERAPEUTIC EXERCISES: CPT

## 2020-12-02 PROCEDURE — 97161 PT EVAL LOW COMPLEX 20 MIN: CPT

## 2020-12-02 PROCEDURE — 73562 X-RAY EXAM OF KNEE 3: CPT | Mod: TC,LT

## 2020-12-02 PROCEDURE — 73562 XR KNEE ORTHO RIGHT WITH FLEXION: ICD-10-PCS | Mod: 26,LT,, | Performed by: RADIOLOGY

## 2020-12-02 PROCEDURE — 97535 SELF CARE MNGMENT TRAINING: CPT

## 2020-12-02 PROCEDURE — 99999 PR PBB SHADOW E&M-EST. PATIENT-LVL IV: CPT | Mod: PBBFAC,,, | Performed by: STUDENT IN AN ORGANIZED HEALTH CARE EDUCATION/TRAINING PROGRAM

## 2020-12-02 PROCEDURE — 99203 PR OFFICE/OUTPT VISIT, NEW, LEVL III, 30-44 MIN: ICD-10-PCS | Mod: S$GLB,,, | Performed by: STUDENT IN AN ORGANIZED HEALTH CARE EDUCATION/TRAINING PROGRAM

## 2020-12-02 PROCEDURE — 99203 OFFICE O/P NEW LOW 30 MIN: CPT | Mod: S$GLB,,, | Performed by: STUDENT IN AN ORGANIZED HEALTH CARE EDUCATION/TRAINING PROGRAM

## 2020-12-02 PROCEDURE — 99999 PR PBB SHADOW E&M-EST. PATIENT-LVL IV: ICD-10-PCS | Mod: PBBFAC,,, | Performed by: STUDENT IN AN ORGANIZED HEALTH CARE EDUCATION/TRAINING PROGRAM

## 2020-12-02 PROCEDURE — 73562 X-RAY EXAM OF KNEE 3: CPT | Mod: 26,LT,, | Performed by: RADIOLOGY

## 2020-12-02 RX ORDER — MELOXICAM 15 MG/1
15 TABLET ORAL DAILY
Qty: 30 TABLET | Refills: 1 | Status: SHIPPED | OUTPATIENT
Start: 2020-12-02 | End: 2021-01-25

## 2020-12-02 NOTE — PLAN OF CARE
[facility]  Physical Therapy Initial Evaluation       Name: Aliza Sagastume  Clinic Number: [id number]    Therapy Diagnosis:   Encounter Diagnoses   Name Primary?    Right knee pain, unspecified chronicity     Bilateral primary osteoarthritis of knee      Physician: Corbin Heredia MD   Physician Orders: PT Eval and Treat  Medical Diagnosis from Referral: Bilateral Knee OA  Evaluation Date: 12/2/2020  Authorization Period Expiration: 12/2/2021  Plan of Care Expiration: 3/2/2021  Progress Update: 1/2/2021  Visit # / Visits authorized: 1 / 1     PRECAUTIONS: Diabetes: Pre-diabetic, Cardiac precautions: HTN, hyperlibidemia and Morbid Obesity, Dyspnea on exertion       Time In: 1630  Time Out: 1720  Total Appointment Time (timed & untimed codes): 50 minutes    SUBJECTIVE   Date of onset: 11/2/2020  History of current condition - Aliza is a 49 y.o. female whom reports that she is having trouble with her right knee.  Stiffness and pain results from prolonged sitting.  Xray's were taken and found to have knee cap bone spurs and arthritis. Walking is also difficult, either at home, or at work that it takes her extra time to transition and walk for long periods of time.   Jimenezs current exercise regiment includes: none.     DANIKA: none  Physician Instructions (per patient): get into therapy, take meloxicam, wear the brace  Other concerns: none    Medical History:   Past Medical History:   Diagnosis Date    CEDRICK positive 08/2017    High cholesterol     History of vitamin D deficiency     Hypertension     Hypothyroidism     Insulin resistance 9/12/2013    Kidney stones     Pre-diabetes     Snoring        Surgical History:   Aliza Sagastume  has a past surgical history that includes Dilation and curettage of uterus; Cystoscopy w/ laser lithotripsy; and Uterine fibroid surgery (2019).    Medications:   Aliza has a current medication list which includes the following  prescription(s): cyclobenzaprine, diclofenac sodium, ergocalciferol, meloxicam, metformin, multivitamin, olmesartan-hydrochlorothiazide, and simvastatin.    Allergies:   Review of patient's allergies indicates:  No Known Allergies     Imaging: X-RAY performed on 12/2/2020  There is no radiographic evidence of acute osseous, articular, or soft tissue abnormality.  There small tricompartmental marginal osteophytes are present bilaterally.  There is mild joint space narrowing suspected in the bilateral medial compartments which appears slightly worse on the left.    Prior Therapy: N/A  Social History: Pt lives alone   Living Environment: 1 story   ADLs unable to complete: getting in and out of bed, walking first few steps are very painful   Gym/Home Equipment: none  Occupation: Pt is sedentary at a [employer]- tries to get up hourly.   Prior Level of Function: Independent with all ADLs  Current Level of Function: 60% of PLOF    Pain:  Current 0/10, worst 8/10, best 0/10   Location: Right knee worse > than Left knee  Description: Aching, Dull, Sharp and Shooting  Aggravating Factors: getting in and out of bed, walking, performing ADLs for long periods of time  Easing Factors: 800 mg, 1 time per day, and needs it every day.      Dominant Extremity: Right    Pts goals: Pt reported goals are to get back to exercise to loose weight- she enjoys walking and wants to purchase a treadmill in the future.     OBJECTIVE   (x = not tested due to pain and/or inability to obtain test position)    RANGE OF MOTION:    Hip AROM/PROM Right  12/2/2020 Left  12/2/2020 Pain/Dysfunction with Movement Goal   Hip Flexion (120) 105 110 Yes - right hip 120  Initial: 105/110   Hip IR (45) 18 30 Yes - right hip 40  Initial: 18/30   Hip ER (45) 30 23 Yes - right hip 40  Initial: 30/23     Knee AROM/PROM Right  12/2/2020 Left  12/2/2020 Pain/Dysfunction with Movement Goal   Hyper - Zero - Flexion 0-0- 75 5-0-95 Yes - end range pain  bilateral 0-0-135  Initial:   0-0-75  5-0-95     STRENGTH:    L/E MMT Right  12/2/2020 Left  12/2/2020 Pain/Dysfunction with Movement Goal   Hip Flexion  4-/5 4/5 Yes - right 5/5 B    Hip Abduction  4/5 4/5 No 5/5 B   Hip IR 4/5 4/5 Yes - bilaterally 5/5 B   Hip ER 4/5 4/5 Yes - bilaterally 5/5 B   Knee Flexion 4-/5 4/5 Yes - right very week 5/5 B   Knee Extension 4/5 4/5 Yes - right 5/5 B   Ankle DF 5/5 5/5 No 5/5 B   Ankle PF 5/5 5/5 No 5/5 B   Ankle Inversion 5/5 5/5 No 5/5 B   Ankle Eversion 5/5 5/5 No 5/5 B       MUSCLE LENGTH:     Muscle Tested  Right  12/2/2020 Left   12/2/2020 Goal   Hip Flexors  decreased decreased Normal B   Quadriceps decreased decreased Normal B   Hamstrings  decreased decreased Normal B   Piriformis  decreased decreased Normal B   Gastrocnemius  decreased decreased Normal B   Soleus  decreased decreased Normal B     JOINT MOBILITY:  NT      SPECIAL TESTS: NT      Sensation:  Sensation is intact to light touch    Palpation: Increased tone and tenderness noted with palpation to: albert patella    Posture:  Pt presents with postural abnormalities which include: forward head, rounded shoulders , genu valgum , ankle/foot pronation , genu recurvartum, rounded shoulders and ankle/foot pronation    Gait Analysis: The patient ambulated with the following assistive device: none; the pt presents with the following gait abnormalities: decreased step length, maritza, and arm swing; increased forward flexed posture, trunk sway     Movement Analysis:   Functional Test  Outcome   Double Leg Squat Good prabha, painful with multiple reps   Single Leg Step Down  NT     Rehab Tests  Outcome Norms GOAL   Timed Up and Go 12.10 <13.5 sec 11 sec   Five Time Sit to Stand 17.19 60s: <11.4 sec  70s: <12.6 sec  80s: 14.8 sec <11.4 sec       Balance: Balance: NT      FUNCTION:     CMS Impairment/Limitation/Restriction for FOTO KNEE Survey    Therapist reviewed FOTO scores for Aliza Sagastume on 12/2/2020.   FOTO  documents entered into EPIC - see Media section.    Limitation Score: ERROR (tried 2x's without success)         TREATMENT   Total Treatment time separate from Evaluation: (18) minutes    Aliza received therapeutic exercises to develop strength, endurance, ROM, flexibility, and posture for (10) minutes including: x = exercises performed     TherEx 12/2/2020    ROM/Chondral: PedEx in Bariatric chair Next visit              Functional testing:   TUG, 5xSTS     Plan for Next Visit: standing stretching: gastroc, hamstring, stair knee bends.  Hip Extensions, Abduction, Calf Raises, squats       Home Exercises and Patient Education Provided    Education/Self-Care provided:  (8) minutes    Patient educated on the impairments noted above and the effects of physical therapy intervention to improve overall condition and QOL.    Patient was educated on all the above exercise prior/during/after for proper posture, positioning, and execution for safe performance with home exercise program.    Exercise/Activity modifications: to be achieved over the next few PT visits.      Written Home Exercises Provided: no, to be given at her next visit    ASSESSMENT   Aliza is a 49 y.o. female referred to outpatient Physical Therapy with a medical diagnosis of knee osteoarthritis. Aliza presents with clinical signs and symptoms that support this diagnosis with decreased knee and hip ROM, decreased hip girdle, quad, and hamstring Strength,increased fall risk with functional testing, and impaired functional mobility.  She was administered a patellar tracking brace with lateral posting to help with patellar mobility/posiitoning and placement during ambulation - she was encouraged to wear during rene tasks and bring to PT. The above impairments will be addressed through manual therapy techniques, therapeutic exercises, functional training, and modalities as necessary. Patient was treated and educated on exercises for home program,  "progression of therapy, and benefits of therapy to achieve full functional mobility. Patient will benefit from skilled physical therapy to meet short and long term goals and return to prior level of function.    Pt prognosis is Good.   Pt will benefit from skilled outpatient Physical Therapy to address the deficits stated above and in the chart below, provide pt/family education, and to maximize pt's level of independence.     Plan of care discussed with patient: Yes  Pt's spiritual, cultural and educational needs considered and patient is agreeable to the plan of care and goals as stated below:     Anticipated Barriers for therapy: co-morbidities, sedentary lifestyle and chronicity of condition    Medical Necessity is demonstrated by the following  History  Co-morbidities and personal factors that may impact the plan of care Co-morbidities:   high BMI, HTN and Pre-diabetes, Morbid Obesity    Personal Factors:   no deficits     moderate   Examination  Body Structures and Functions, activity limitations and participation restrictions that may impact the plan of care Body Regions:   lower extremities    Body Systems:    gross symmetry  ROM  strength  gross coordinated movement  balance  gait  transfers  transitions  motor control    Participation Restrictions:   See above in "Current Level of Function"     Activity limitations:   Learning and applying knowledge  no deficits    General Tasks and Commands  no deficits    Communication  no deficits    Mobility  lifting and carrying objects  walking    Self care  looking after one's health    Domestic Life  shopping  cooking  doing house work (cleaning house, washing dishes, laundry)  assisting others    Interactions/Relationships  no deficits    Life Areas  no deficits    Community and Social Life  community life  recreation and leisure         high   Clinical Presentation stable and uncomplicated low   Decision Making/ Complexity Score: low       GOALS:  SHORT TERM " GOALS: 4 weeks 12/2/2020   1. Recent signs and systems trend is improving in order to progress towards LTG's.    2. Patient will be independent with HEP in order to further progress and return to maximal function.    3. Pain rating at Worst: 5/10 in order to progress towards increased independence with activity.    4. Patient will be able to correct postural deviations in sitting and standing, to decrease pain and promote postural awareness for injury prevention.       LONG TERM GOALS: 12 weeks 12/2/2020   1. Patient will return to normal ADL, recreational, and work related activities with less pain and limitation.     2. Patient will improve AROM to stated goals in order to return to maximal functional potential.     3. Patient will improve Strength to stated goals of appropriate musculature in order to improve functional independence.     4. Pain Rating at Best: 1/10 to improve Quality of Life.     5. Patient will meet predicted functional outcome (FOTO) score: (ERROR) % to increase self-worth & perceived functional ability.    6. Patient will have met/partially met personal goal of: getting back to exercising to loose wait and get more healthy          PLAN   Plan of care Certification: 12/2/2020 to 3/2/2021    Outpatient Physical Therapy 1 times weekly for 12 weeks to include any combination of the following interventions: virtual visits, dry needling, modalities, electrical stimulation (IFC, Pre-Mod, Attended with Functional Dry Needling), Aquatic Therapy, Gait Training, Manual Therapy, Moist Heat/ Ice, Neuromuscular Re-ed, Patient Education, Self Care, Therapeutic Activites, Therapeutic Exercise and Functional Training     Thank you for this referral.    These services are reasonable and necessary for the conditions set forth above while under my care.    Mena Heredia, PT, DPT

## 2020-12-02 NOTE — LETTER
December 2, 2020      Kayla Bradshaw MD  [address] [place] [address]           [facility]  [address]  [place] [address]  Phone: [phone]  Fax: [phone]          Patient: Aliza Sagastume   MR Number: [id number]   YOB: 1970   Date of Visit: 12/2/2020       Dear Dr. Kayla Bradshaw:    Thank you for referring Aliza Sagastume to me for evaluation. Attached you will find relevant portions of my assessment and plan of care.    If you have questions, please do not hesitate to call me. I look forward to following Aliza Sagastume along with you.    Sincerely,    Corbin Heredia MD    Enclosure  CC:  No Recipients    If you would like to receive this communication electronically, please contact [email or web address] or [phone] to request more information on [facility] access.    For providers and/or their staff who would like to refer a patient to [facility], please contact us through our one-stop-shop provider referral line, [facility] , at [phone].    If you feel you have received this communication in error or would no longer like to receive these types of communications, please e-mail [email or web address]

## 2020-12-02 NOTE — PATIENT INSTRUCTIONS
You were prescribed meloxicam today as an anti-inflammatory medicine for the pain you are having.  Please take this medicine once a day with food for 2 weeks, and then as needed for days with significant recurrent pain.  Please do not take any other anti-inflammatories such as naproxen, ibuprofen, Aleve at the same time year taking this medicine.    Apply topical diclofenac (Voltaren) up to 4 times a day to the affected area.  It can be bought over the counter at any local pharmacy.

## 2020-12-02 NOTE — PROGRESS NOTES
Patient ID: Aliza Sagastume  YOB: 1970  MRN: [id number]    Chief Complaint: Pain of the Left Knee and Pain of the Right Knee      Referred By: Kayla Bradshaw MD for knee pain on both knee    History of Present Illness: Aliza Sagastume is a right-hand dominant 49 y.o. female who presents today with knee pain on both knee.     It has been present for a few years.   Pain is located in both knee(s).   The pain is described as constant, moderate, severe.  The symptoms are worse withmovement, walking, standing, sitting, lying down  The patient has musle relaxer, ibuprofen.   Related to injury: no.    The patient is active in none.  Occupation: [employer]    Trin[place] is a 49-year-old female presenting today with bilateral knee pain right worse than left.  She denies any specific injuries or traumas.  But has been getting worse over the last few years.  She gained approximately 25-30 lb over the pain down make in her knees have been progressively getting worse over the last few months.  Pain is worse with all types of movements, walking and especially from rising from a sitting to standing position after prolonged period of time.  Positive theater sign.  She is having some night pain associated with her knees as well.    She has tried muscle relaxers as well as ibuprofen with some relief.  Denies instability, mechanical symptoms or locking    Past Medical History:   Past Medical History:   Diagnosis Date    CEDRICK positive 08/2017    High cholesterol     History of vitamin D deficiency     Hypertension     Hypothyroidism     Insulin resistance 9/12/2013    Kidney stones     Pre-diabetes     Snoring      Past Surgical History:   Procedure Laterality Date    CYSTOSCOPY W/ LASER LITHOTRIPSY      x 2    DILATION AND CURETTAGE OF UTERUS      x1 for abnormal bleeding    UTERINE FIBROID SURGERY  2019     Family History   Problem Relation Age of Onset    Hypertension Mother     Heart disease  Father 69        MI    Hypertension Father     Diabetes Father     Cancer Father         prostate cancer    Hypertension Sister     Cancer Paternal Aunt         Brain cancer    Cancer Paternal Uncle         Pancreas cancer    Hypertension Maternal Grandmother     Hypertension Maternal Grandfather     Stroke Maternal Grandfather 86    Hypertension Paternal Grandmother     Hypertension Paternal Grandfather     No Known Problems Daughter     No Known Problems Daughter     Cancer Cousin         Breast cancer    Breast cancer Paternal Cousin      Social History     Socioeconomic History    Marital status:      Spouse name: Not on file    Number of children: 2    Years of education: Not on file    Highest education level: Not on file   Occupational History    Occupation:      Employer: [employer]   Social Needs    Financial resource strain: Not on file    Food insecurity     Worry: Not on file     Inability: Not on file    Transportation needs     Medical: No     Non-medical: No   Tobacco Use    Smoking status: Never Smoker    Smokeless tobacco: Never Used   Substance and Sexual Activity    Alcohol use: Yes     Alcohol/week: 0.0 standard drinks     Frequency: Monthly or less     Drinks per session: 1 or 2     Binge frequency: Never     Comment: occasionally    Drug use: No    Sexual activity: Never   Lifestyle    Physical activity     Days per week: 0 days     Minutes per session: 0 min    Stress: Only a little   Relationships    Social connections     Talks on phone: Once a week     Gets together: Patient refused     Attends [employer] service: More than 4 times per year     Active member of club or organization: Yes     Attends meetings of clubs or organizations: Patient refused     Relationship status:    Other Topics Concern    Not on file   Social History Narrative    She wears seatbelt.     Medication List with Changes/Refills   Current  Medications    CYCLOBENZAPRINE (FLEXERIL) 10 MG TABLET    TAKE 1 TABLET(10 MG) BY MOUTH EVERY EVENING AS NEEDED FOR MUSCLE SPASMS    DICLOFENAC SODIUM (VOLTAREN) 1 % GEL    Apply 2 g topically 4 (four) times daily as needed.    ERGOCALCIFEROL (ERGOCALCIFEROL) 50,000 UNIT CAP    TAKE ONE CAPSULE BY MOUTH TWICE A WEEK( ON MONDAYS AND FRIDAYS)    IBUPROFEN (ADVIL,MOTRIN) 800 MG TABLET    TAKE 1 TABLET(800 MG) BY MOUTH TWICE DAILY WITH A MEAL    METFORMIN (GLUCOPHAGE) 500 MG TABLET    TAKE 1 TABLET(500 MG) BY MOUTH TWICE DAILY WITH A MEAL    MULTIVITAMIN CAPSULE    Take 1 capsule by mouth once daily.    OLMESARTAN-HYDROCHLOROTHIAZIDE (BENICAR HCT) 40-25 MG PER TABLET    TAKE 1 TABLET BY MOUTH EVERY DAY    SIMVASTATIN (ZOCOR) 20 MG TABLET    TAKE 1 TABLET(20 MG) BY MOUTH EVERY EVENING     Review of patient's allergies indicates:  No Known Allergies  Review of Systems   Musculoskeletal: Positive for joint pain, joint swelling, muscle cramps, muscle weakness and stiffness.       Physical Exam:   There is no height or weight on file to calculate BMI.    GENERAL: Well appearing, in no acute distress.  HEAD: Normocephalic and atraumatic.  ENT: External ears and nose grossly normal.  EYES: EOMI bilaterally  PULMONARY: Respirations are grossly even and non-labored.  NEURO: Awake, alert, and oriented x 3.  SKIN: No obvious rashes appreciated.  PSYCH: Mood & affect are appropriate.    Detailed MSK exam:     Bilateral knee exam  Good patellar mobility bilaterally with no effusions appreciated.  Range of motion symmetric an approximately to 100° in flexion with full extension  Ligaments grossly intact bilaterally  Tenderness palpation over the right medial joint line extending posteriorly.  No other tenderness appreciated on exam  Positive Hattie's on the right with medial joint loading    Imaging:    Narrative & Impression     EXAMINATION:  XR KNEE ORTHO RIGHT WITH FLEXION     CLINICAL HISTORY:  Pain in right  knee     TECHNIQUE:  Standing AP, standing PA flexion, and Merchant views were obtained of the bilateral knees.  Standing lateral view of the right knee was obtained.     COMPARISON:  None.     FINDINGS:  There is no radiographic evidence of acute osseous, articular, or soft tissue abnormality.  There small tricompartmental marginal osteophytes are present bilaterally.  There is mild joint space narrowing suspected in the bilateral medial compartments which appears slightly worse on the left.     Impression:     Degenerative findings as above        Electronically signed by: Benjamin Pittman MD  Date:                                            12/02/2020  Time:                                           16:01         Relevant imaging results were reviewed and interpreted by me and per my read bilateral tricompartmental osteoarthritis mild in nature, worse in the medial compartment bilaterally.  There is also significant lateral tilt to the bilateral patella This was discussed with the patient and / or family today.     Assessment:  Aliza Sagastume is a 49 y.o. female presenting today with bilateral knee pain right worse than left, consistent with tricompartmental osteoarthritis of the knees.  We discussed weight loss and exercise of the mainstays and treatment for arthritis as well as the prognosis and other treatment options available.  We discussed anti-inflammatories such as meloxicam that she would take for 2 weeks with food and that taking the anti-inflammatories at that time.  We also discussed topical diclofenac to apply the medial aspect of the knee where she is having some issues.  We will get her into physical therapy and will trying fit her for a brace to help with some of the lateral patellar tilt noticed on x-rays in my exam today.    Follow-up in 8-10 weeks    Bilateral primary osteoarthritis of knee  -     meloxicam (MOBIC) 15 MG tablet; Take 1 tablet (15 mg total) by mouth once daily.  Dispense:  30 tablet; Refill: 1  -     Ambulatory referral/consult to Physical/Occupational Therapy; Future; Expected date: 12/09/2020    Right knee pain, unspecified chronicity  -     Ambulatory referral/consult to Orthopedics  -     meloxicam (MOBIC) 15 MG tablet; Take 1 tablet (15 mg total) by mouth once daily.  Dispense: 30 tablet; Refill: 1  -     Ambulatory referral/consult to Physical/Occupational Therapy; Future; Expected date: 12/09/2020         A copy of today's visit note has been sent to the referring provider.     Corbin Heredia MD    Disclaimer: This note was prepared using a voice recognition system and is likely to have sound alike errors within the text.

## 2020-12-04 ENCOUNTER — PATIENT MESSAGE (OUTPATIENT)
Dept: ORTHOPEDICS | Facility: CLINIC | Age: 50
End: 2020-12-04

## 2020-12-04 ENCOUNTER — PATIENT MESSAGE (OUTPATIENT)
Dept: REHABILITATION | Facility: HOSPITAL | Age: 50
End: 2020-12-04

## 2021-01-04 ENCOUNTER — PATIENT MESSAGE (OUTPATIENT)
Dept: REHABILITATION | Facility: HOSPITAL | Age: 51
End: 2021-01-04

## 2021-01-06 ENCOUNTER — PATIENT OUTREACH (OUTPATIENT)
Dept: ADMINISTRATIVE | Facility: HOSPITAL | Age: 51
End: 2021-01-06

## 2021-01-20 ENCOUNTER — OFFICE VISIT (OUTPATIENT)
Dept: ORTHOPEDICS | Facility: CLINIC | Age: 51
End: 2021-01-20
Payer: COMMERCIAL

## 2021-01-20 VITALS — HEIGHT: 66 IN | WEIGHT: 293 LBS | BODY MASS INDEX: 47.09 KG/M2

## 2021-01-20 DIAGNOSIS — M17.0 BILATERAL PRIMARY OSTEOARTHRITIS OF KNEE: Primary | ICD-10-CM

## 2021-01-20 PROCEDURE — 99999 PR PBB SHADOW E&M-EST. PATIENT-LVL III: ICD-10-PCS | Mod: PBBFAC,,, | Performed by: STUDENT IN AN ORGANIZED HEALTH CARE EDUCATION/TRAINING PROGRAM

## 2021-01-20 PROCEDURE — 99999 PR PBB SHADOW E&M-EST. PATIENT-LVL III: CPT | Mod: PBBFAC,,, | Performed by: STUDENT IN AN ORGANIZED HEALTH CARE EDUCATION/TRAINING PROGRAM

## 2021-01-20 PROCEDURE — 99213 PR OFFICE/OUTPT VISIT, EST, LEVL III, 20-29 MIN: ICD-10-PCS | Mod: S$GLB,,, | Performed by: STUDENT IN AN ORGANIZED HEALTH CARE EDUCATION/TRAINING PROGRAM

## 2021-01-20 PROCEDURE — 99213 OFFICE O/P EST LOW 20 MIN: CPT | Mod: S$GLB,,, | Performed by: STUDENT IN AN ORGANIZED HEALTH CARE EDUCATION/TRAINING PROGRAM

## 2021-01-29 ENCOUNTER — PATIENT MESSAGE (OUTPATIENT)
Dept: ADMINISTRATIVE | Facility: HOSPITAL | Age: 51
End: 2021-01-29

## 2021-02-17 ENCOUNTER — PATIENT MESSAGE (OUTPATIENT)
Dept: ADMINISTRATIVE | Facility: HOSPITAL | Age: 51
End: 2021-02-17

## 2021-03-03 ENCOUNTER — PATIENT MESSAGE (OUTPATIENT)
Dept: FAMILY MEDICINE | Facility: CLINIC | Age: 51
End: 2021-03-03

## 2021-03-03 ENCOUNTER — PATIENT MESSAGE (OUTPATIENT)
Dept: CARDIOLOGY | Facility: CLINIC | Age: 51
End: 2021-03-03

## 2021-03-04 ENCOUNTER — PATIENT MESSAGE (OUTPATIENT)
Dept: FAMILY MEDICINE | Facility: CLINIC | Age: 51
End: 2021-03-04

## 2021-03-05 ENCOUNTER — IMMUNIZATION (OUTPATIENT)
Dept: INTERNAL MEDICINE | Facility: CLINIC | Age: 51
End: 2021-03-05

## 2021-03-05 DIAGNOSIS — Z23 NEED FOR VACCINATION: Primary | ICD-10-CM

## 2021-03-05 PROCEDURE — 0001A COVID-19, MRNA, LNP-S, PF, 30 MCG/0.3 ML DOSE VACCINE: CPT | Mod: CV19,S$GLB,, | Performed by: FAMILY MEDICINE

## 2021-03-05 PROCEDURE — 91300 COVID-19, MRNA, LNP-S, PF, 30 MCG/0.3 ML DOSE VACCINE: CPT | Mod: S$GLB,,, | Performed by: FAMILY MEDICINE

## 2021-03-05 PROCEDURE — 91300 COVID-19, MRNA, LNP-S, PF, 30 MCG/0.3 ML DOSE VACCINE: ICD-10-PCS | Mod: S$GLB,,, | Performed by: FAMILY MEDICINE

## 2021-03-05 PROCEDURE — 0001A COVID-19, MRNA, LNP-S, PF, 30 MCG/0.3 ML DOSE VACCINE: ICD-10-PCS | Mod: CV19,S$GLB,, | Performed by: FAMILY MEDICINE

## 2021-03-08 ENCOUNTER — PATIENT MESSAGE (OUTPATIENT)
Dept: PULMONOLOGY | Facility: CLINIC | Age: 51
End: 2021-03-08

## 2021-03-08 ENCOUNTER — PATIENT MESSAGE (OUTPATIENT)
Dept: CARDIOLOGY | Facility: CLINIC | Age: 51
End: 2021-03-08

## 2021-03-10 ENCOUNTER — PATIENT MESSAGE (OUTPATIENT)
Dept: FAMILY MEDICINE | Facility: CLINIC | Age: 51
End: 2021-03-10

## 2021-03-11 ENCOUNTER — TELEPHONE (OUTPATIENT)
Dept: FAMILY MEDICINE | Facility: CLINIC | Age: 51
End: 2021-03-11

## 2021-03-11 ENCOUNTER — OFFICE VISIT (OUTPATIENT)
Dept: FAMILY MEDICINE | Facility: CLINIC | Age: 51
End: 2021-03-11
Payer: COMMERCIAL

## 2021-03-11 ENCOUNTER — LAB VISIT (OUTPATIENT)
Dept: LAB | Facility: HOSPITAL | Age: 51
End: 2021-03-11
Attending: FAMILY MEDICINE
Payer: COMMERCIAL

## 2021-03-11 VITALS
TEMPERATURE: 98 F | OXYGEN SATURATION: 97 % | HEIGHT: 66 IN | WEIGHT: 293 LBS | HEART RATE: 92 BPM | SYSTOLIC BLOOD PRESSURE: 128 MMHG | DIASTOLIC BLOOD PRESSURE: 80 MMHG | BODY MASS INDEX: 47.09 KG/M2

## 2021-03-11 DIAGNOSIS — G47.33 OSA (OBSTRUCTIVE SLEEP APNEA): ICD-10-CM

## 2021-03-11 DIAGNOSIS — E55.9 VITAMIN D DEFICIENCY DISEASE: ICD-10-CM

## 2021-03-11 DIAGNOSIS — I10 ESSENTIAL HYPERTENSION: ICD-10-CM

## 2021-03-11 DIAGNOSIS — R73.03 PREDIABETES: ICD-10-CM

## 2021-03-11 DIAGNOSIS — E88.819 INSULIN RESISTANCE: ICD-10-CM

## 2021-03-11 DIAGNOSIS — Z01.818 PREOPERATIVE CLEARANCE: ICD-10-CM

## 2021-03-11 DIAGNOSIS — Z01.818 PREOPERATIVE CLEARANCE: Primary | ICD-10-CM

## 2021-03-11 DIAGNOSIS — E66.01 MORBID OBESITY WITH BMI OF 60.0-69.9, ADULT: ICD-10-CM

## 2021-03-11 DIAGNOSIS — E78.2 MIXED HYPERLIPIDEMIA: ICD-10-CM

## 2021-03-11 LAB
BASOPHILS # BLD AUTO: 0.03 K/UL (ref 0–0.2)
BASOPHILS NFR BLD: 0.4 % (ref 0–1.9)
DIFFERENTIAL METHOD: ABNORMAL
EOSINOPHIL # BLD AUTO: 0.1 K/UL (ref 0–0.5)
EOSINOPHIL NFR BLD: 1 % (ref 0–8)
ERYTHROCYTE [DISTWIDTH] IN BLOOD BY AUTOMATED COUNT: 14.6 % (ref 11.5–14.5)
HCT VFR BLD AUTO: 41.1 % (ref 37–48.5)
HGB BLD-MCNC: 12.7 G/DL (ref 12–16)
IMM GRANULOCYTES # BLD AUTO: 0.02 K/UL (ref 0–0.04)
IMM GRANULOCYTES NFR BLD AUTO: 0.3 % (ref 0–0.5)
LYMPHOCYTES # BLD AUTO: 1.9 K/UL (ref 1–4.8)
LYMPHOCYTES NFR BLD: 26.1 % (ref 18–48)
MCH RBC QN AUTO: 25.5 PG (ref 27–31)
MCHC RBC AUTO-ENTMCNC: 30.9 G/DL (ref 32–36)
MCV RBC AUTO: 82 FL (ref 82–98)
MONOCYTES # BLD AUTO: 0.5 K/UL (ref 0.3–1)
MONOCYTES NFR BLD: 6.9 % (ref 4–15)
NEUTROPHILS # BLD AUTO: 4.8 K/UL (ref 1.8–7.7)
NEUTROPHILS NFR BLD: 65.3 % (ref 38–73)
NRBC BLD-RTO: 0 /100 WBC
PLATELET # BLD AUTO: 226 K/UL (ref 150–350)
PMV BLD AUTO: 11.7 FL (ref 9.2–12.9)
RBC # BLD AUTO: 4.99 M/UL (ref 4–5.4)
WBC # BLD AUTO: 7.27 K/UL (ref 3.9–12.7)

## 2021-03-11 PROCEDURE — 80053 COMPREHEN METABOLIC PANEL: CPT | Performed by: FAMILY MEDICINE

## 2021-03-11 PROCEDURE — 36415 COLL VENOUS BLD VENIPUNCTURE: CPT | Mod: PO | Performed by: FAMILY MEDICINE

## 2021-03-11 PROCEDURE — 83036 HEMOGLOBIN GLYCOSYLATED A1C: CPT | Performed by: FAMILY MEDICINE

## 2021-03-11 PROCEDURE — 99999 PR PBB SHADOW E&M-EST. PATIENT-LVL IV: ICD-10-PCS | Mod: PBBFAC,,, | Performed by: FAMILY MEDICINE

## 2021-03-11 PROCEDURE — 99214 PR OFFICE/OUTPT VISIT, EST, LEVL IV, 30-39 MIN: ICD-10-PCS | Mod: S$GLB,,, | Performed by: FAMILY MEDICINE

## 2021-03-11 PROCEDURE — 85025 COMPLETE CBC W/AUTO DIFF WBC: CPT | Performed by: FAMILY MEDICINE

## 2021-03-11 PROCEDURE — 99214 OFFICE O/P EST MOD 30 MIN: CPT | Mod: S$GLB,,, | Performed by: FAMILY MEDICINE

## 2021-03-11 PROCEDURE — 84443 ASSAY THYROID STIM HORMONE: CPT | Performed by: FAMILY MEDICINE

## 2021-03-11 PROCEDURE — 99999 PR PBB SHADOW E&M-EST. PATIENT-LVL IV: CPT | Mod: PBBFAC,,, | Performed by: FAMILY MEDICINE

## 2021-03-12 ENCOUNTER — PATIENT MESSAGE (OUTPATIENT)
Dept: FAMILY MEDICINE | Facility: CLINIC | Age: 51
End: 2021-03-12

## 2021-03-12 ENCOUNTER — OFFICE VISIT (OUTPATIENT)
Dept: CARDIOLOGY | Facility: CLINIC | Age: 51
End: 2021-03-12
Payer: COMMERCIAL

## 2021-03-12 VITALS
OXYGEN SATURATION: 98 % | HEART RATE: 103 BPM | WEIGHT: 293 LBS | BODY MASS INDEX: 47.09 KG/M2 | HEIGHT: 66 IN | SYSTOLIC BLOOD PRESSURE: 126 MMHG | DIASTOLIC BLOOD PRESSURE: 76 MMHG

## 2021-03-12 DIAGNOSIS — I10 ESSENTIAL HYPERTENSION: ICD-10-CM

## 2021-03-12 DIAGNOSIS — E88.819 INSULIN RESISTANCE: ICD-10-CM

## 2021-03-12 DIAGNOSIS — Z01.810 PREOP CARDIOVASCULAR EXAM: ICD-10-CM

## 2021-03-12 DIAGNOSIS — E66.01 MORBID OBESITY WITH BMI OF 60.0-69.9, ADULT: Primary | ICD-10-CM

## 2021-03-12 DIAGNOSIS — E78.2 MIXED HYPERLIPIDEMIA: ICD-10-CM

## 2021-03-12 LAB
ALBUMIN SERPL BCP-MCNC: 4 G/DL (ref 3.5–5.2)
ALP SERPL-CCNC: 68 U/L (ref 55–135)
ALT SERPL W/O P-5'-P-CCNC: 15 U/L (ref 10–44)
ANION GAP SERPL CALC-SCNC: 13 MMOL/L (ref 8–16)
AST SERPL-CCNC: 18 U/L (ref 10–40)
BILIRUB SERPL-MCNC: 0.4 MG/DL (ref 0.1–1)
BUN SERPL-MCNC: 26 MG/DL (ref 6–20)
CALCIUM SERPL-MCNC: 9.4 MG/DL (ref 8.7–10.5)
CHLORIDE SERPL-SCNC: 102 MMOL/L (ref 95–110)
CO2 SERPL-SCNC: 24 MMOL/L (ref 23–29)
CREAT SERPL-MCNC: 1.1 MG/DL (ref 0.5–1.4)
EST. GFR  (AFRICAN AMERICAN): >60 ML/MIN/1.73 M^2
EST. GFR  (NON AFRICAN AMERICAN): 57.9 ML/MIN/1.73 M^2
ESTIMATED AVG GLUCOSE: 126 MG/DL (ref 68–131)
GLUCOSE SERPL-MCNC: 86 MG/DL (ref 70–110)
HBA1C MFR BLD: 6 % (ref 4–5.6)
POTASSIUM SERPL-SCNC: 3.6 MMOL/L (ref 3.5–5.1)
PROT SERPL-MCNC: 8.3 G/DL (ref 6–8.4)
SODIUM SERPL-SCNC: 139 MMOL/L (ref 136–145)
TSH SERPL DL<=0.005 MIU/L-ACNC: 3.87 UIU/ML (ref 0.4–4)

## 2021-03-12 PROCEDURE — 99214 OFFICE O/P EST MOD 30 MIN: CPT | Mod: S$GLB,,, | Performed by: INTERNAL MEDICINE

## 2021-03-12 PROCEDURE — 99214 PR OFFICE/OUTPT VISIT, EST, LEVL IV, 30-39 MIN: ICD-10-PCS | Mod: S$GLB,,, | Performed by: INTERNAL MEDICINE

## 2021-03-12 PROCEDURE — 99999 PR PBB SHADOW E&M-EST. PATIENT-LVL III: ICD-10-PCS | Mod: PBBFAC,,, | Performed by: INTERNAL MEDICINE

## 2021-03-12 PROCEDURE — 99999 PR PBB SHADOW E&M-EST. PATIENT-LVL III: CPT | Mod: PBBFAC,,, | Performed by: INTERNAL MEDICINE

## 2021-03-14 ENCOUNTER — PATIENT MESSAGE (OUTPATIENT)
Dept: FAMILY MEDICINE | Facility: CLINIC | Age: 51
End: 2021-03-14

## 2021-03-22 ENCOUNTER — PATIENT MESSAGE (OUTPATIENT)
Dept: FAMILY MEDICINE | Facility: CLINIC | Age: 51
End: 2021-03-22

## 2021-03-22 DIAGNOSIS — E78.5 HYPERLIPIDEMIA, UNSPECIFIED HYPERLIPIDEMIA TYPE: ICD-10-CM

## 2021-03-22 RX ORDER — SIMVASTATIN 20 MG/1
20 TABLET, FILM COATED ORAL NIGHTLY
Qty: 90 TABLET | Refills: 1 | Status: SHIPPED | OUTPATIENT
Start: 2021-03-22 | End: 2021-08-24 | Stop reason: SDUPTHER

## 2021-03-26 ENCOUNTER — IMMUNIZATION (OUTPATIENT)
Dept: INTERNAL MEDICINE | Facility: CLINIC | Age: 51
End: 2021-03-26
Payer: COMMERCIAL

## 2021-03-26 DIAGNOSIS — Z23 NEED FOR VACCINATION: Primary | ICD-10-CM

## 2021-03-26 PROCEDURE — 0002A COVID-19, MRNA, LNP-S, PF, 30 MCG/0.3 ML DOSE VACCINE: CPT | Mod: PBBFAC | Performed by: FAMILY MEDICINE

## 2021-03-26 PROCEDURE — 91300 COVID-19, MRNA, LNP-S, PF, 30 MCG/0.3 ML DOSE VACCINE: CPT | Mod: PBBFAC | Performed by: FAMILY MEDICINE

## 2021-03-29 ENCOUNTER — PATIENT OUTREACH (OUTPATIENT)
Dept: ADMINISTRATIVE | Facility: OTHER | Age: 51
End: 2021-03-29

## 2021-04-29 ENCOUNTER — PATIENT MESSAGE (OUTPATIENT)
Dept: ADMINISTRATIVE | Facility: HOSPITAL | Age: 51
End: 2021-04-29

## 2021-05-05 ENCOUNTER — PATIENT MESSAGE (OUTPATIENT)
Dept: FAMILY MEDICINE | Facility: CLINIC | Age: 51
End: 2021-05-05

## 2021-05-06 ENCOUNTER — HOSPITAL ENCOUNTER (EMERGENCY)
Facility: HOSPITAL | Age: 51
Discharge: HOME | End: 2021-05-06
Attending: EMERGENCY MEDICINE
Payer: COMMERCIAL

## 2021-05-06 ENCOUNTER — PATIENT MESSAGE (OUTPATIENT)
Dept: FAMILY MEDICINE | Facility: CLINIC | Age: 51
End: 2021-05-06

## 2021-05-06 VITALS
DIASTOLIC BLOOD PRESSURE: 75 MMHG | BODY MASS INDEX: 47.09 KG/M2 | HEIGHT: 66 IN | OXYGEN SATURATION: 100 % | TEMPERATURE: 98 F | WEIGHT: 293 LBS | HEART RATE: 92 BPM | SYSTOLIC BLOOD PRESSURE: 139 MMHG | RESPIRATION RATE: 20 BRPM

## 2021-05-06 DIAGNOSIS — N20.1 CALCULUS OF RIGHT URETER: Primary | ICD-10-CM

## 2021-05-06 LAB
ALBUMIN SERPL BCP-MCNC: 3.8 G/DL (ref 3.5–5.2)
ALP SERPL-CCNC: 56 U/L (ref 55–135)
ALT SERPL W/O P-5'-P-CCNC: 13 U/L (ref 10–44)
ANION GAP SERPL CALC-SCNC: 16 MMOL/L (ref 8–16)
AST SERPL-CCNC: 16 U/L (ref 10–40)
BACTERIA #/AREA URNS HPF: ABNORMAL /HPF
BASOPHILS # BLD AUTO: 0.03 K/UL (ref 0–0.2)
BASOPHILS NFR BLD: 0.3 % (ref 0–1.9)
BILIRUB SERPL-MCNC: 0.5 MG/DL (ref 0.1–1)
BILIRUB UR QL STRIP: ABNORMAL
BUN SERPL-MCNC: 19 MG/DL (ref 6–20)
CALCIUM SERPL-MCNC: 9.3 MG/DL (ref 8.7–10.5)
CHLORIDE SERPL-SCNC: 102 MMOL/L (ref 95–110)
CLARITY UR: ABNORMAL
CO2 SERPL-SCNC: 21 MMOL/L (ref 23–29)
COLOR UR: ABNORMAL
CREAT SERPL-MCNC: 1.2 MG/DL (ref 0.5–1.4)
DIFFERENTIAL METHOD: ABNORMAL
EOSINOPHIL # BLD AUTO: 0 K/UL (ref 0–0.5)
EOSINOPHIL NFR BLD: 0.5 % (ref 0–8)
ERYTHROCYTE [DISTWIDTH] IN BLOOD BY AUTOMATED COUNT: 14.4 % (ref 11.5–14.5)
EST. GFR  (AFRICAN AMERICAN): >60 ML/MIN/1.73 M^2
EST. GFR  (NON AFRICAN AMERICAN): 52 ML/MIN/1.73 M^2
GLUCOSE SERPL-MCNC: 114 MG/DL (ref 70–110)
GLUCOSE UR QL STRIP: NEGATIVE
HCT VFR BLD AUTO: 40.6 % (ref 37–48.5)
HCV AB SERPL QL IA: NEGATIVE
HGB BLD-MCNC: 12.7 G/DL (ref 12–16)
HGB UR QL STRIP: ABNORMAL
HIV 1+2 AB+HIV1 P24 AG SERPL QL IA: NEGATIVE
HYALINE CASTS #/AREA URNS LPF: 0 /LPF
IMM GRANULOCYTES # BLD AUTO: 0.04 K/UL (ref 0–0.04)
IMM GRANULOCYTES NFR BLD AUTO: 0.5 % (ref 0–0.5)
KETONES UR QL STRIP: ABNORMAL
LEUKOCYTE ESTERASE UR QL STRIP: NEGATIVE
LIPASE SERPL-CCNC: 45 U/L (ref 4–60)
LYMPHOCYTES # BLD AUTO: 1.2 K/UL (ref 1–4.8)
LYMPHOCYTES NFR BLD: 13.7 % (ref 18–48)
MCH RBC QN AUTO: 25.8 PG (ref 27–31)
MCHC RBC AUTO-ENTMCNC: 31.3 G/DL (ref 32–36)
MCV RBC AUTO: 83 FL (ref 82–98)
MICROSCOPIC COMMENT: ABNORMAL
MONOCYTES # BLD AUTO: 0.8 K/UL (ref 0.3–1)
MONOCYTES NFR BLD: 9 % (ref 4–15)
NEUTROPHILS # BLD AUTO: 6.6 K/UL (ref 1.8–7.7)
NEUTROPHILS NFR BLD: 76 % (ref 38–73)
NITRITE UR QL STRIP: POSITIVE
NRBC BLD-RTO: 0 /100 WBC
PH UR STRIP: 6 [PH] (ref 5–8)
PLATELET # BLD AUTO: 177 K/UL (ref 150–450)
PMV BLD AUTO: 11.5 FL (ref 9.2–12.9)
POTASSIUM SERPL-SCNC: 3.3 MMOL/L (ref 3.5–5.1)
PROT SERPL-MCNC: 7.8 G/DL (ref 6–8.4)
PROT UR QL STRIP: ABNORMAL
RBC # BLD AUTO: 4.92 M/UL (ref 4–5.4)
RBC #/AREA URNS HPF: >100 /HPF (ref 0–4)
SODIUM SERPL-SCNC: 139 MMOL/L (ref 136–145)
SP GR UR STRIP: >=1.03 (ref 1–1.03)
URN SPEC COLLECT METH UR: ABNORMAL
UROBILINOGEN UR STRIP-ACNC: 1 EU/DL
WBC # BLD AUTO: 8.7 K/UL (ref 3.9–12.7)
WBC #/AREA URNS HPF: 2 /HPF (ref 0–5)

## 2021-05-06 PROCEDURE — 63600175 PHARM REV CODE 636 W HCPCS: Performed by: EMERGENCY MEDICINE

## 2021-05-06 PROCEDURE — 83690 ASSAY OF LIPASE: CPT | Performed by: PHYSICIAN ASSISTANT

## 2021-05-06 PROCEDURE — 36415 COLL VENOUS BLD VENIPUNCTURE: CPT | Performed by: EMERGENCY MEDICINE

## 2021-05-06 PROCEDURE — 81000 URINALYSIS NONAUTO W/SCOPE: CPT | Performed by: PHYSICIAN ASSISTANT

## 2021-05-06 PROCEDURE — 86803 HEPATITIS C AB TEST: CPT | Performed by: EMERGENCY MEDICINE

## 2021-05-06 PROCEDURE — 99285 EMERGENCY DEPT VISIT HI MDM: CPT | Mod: 25

## 2021-05-06 PROCEDURE — 86703 HIV-1/HIV-2 1 RESULT ANTBDY: CPT | Performed by: EMERGENCY MEDICINE

## 2021-05-06 PROCEDURE — 80053 COMPREHEN METABOLIC PANEL: CPT | Performed by: PHYSICIAN ASSISTANT

## 2021-05-06 PROCEDURE — 85025 COMPLETE CBC W/AUTO DIFF WBC: CPT | Performed by: PHYSICIAN ASSISTANT

## 2021-05-06 PROCEDURE — 96365 THER/PROPH/DIAG IV INF INIT: CPT

## 2021-05-06 RX ORDER — ONDANSETRON 4 MG/1
4 TABLET, FILM COATED ORAL EVERY 6 HOURS PRN
Qty: 12 TABLET | Refills: 0 | Status: SHIPPED | OUTPATIENT
Start: 2021-05-06 | End: 2022-01-18

## 2021-05-06 RX ORDER — TAMSULOSIN HYDROCHLORIDE 0.4 MG/1
0.4 CAPSULE ORAL DAILY
Qty: 30 CAPSULE | Refills: 0 | Status: SHIPPED | OUTPATIENT
Start: 2021-05-06 | End: 2022-01-18

## 2021-05-06 RX ORDER — HYDROCODONE BITARTRATE AND ACETAMINOPHEN 10; 325 MG/1; MG/1
1 TABLET ORAL EVERY 6 HOURS PRN
Qty: 9 TABLET | Refills: 0 | Status: SHIPPED | OUTPATIENT
Start: 2021-05-06 | End: 2021-05-17

## 2021-05-06 RX ADMIN — CEFTRIAXONE 1 G: 1 INJECTION, SOLUTION INTRAVENOUS at 06:05

## 2021-05-17 ENCOUNTER — OFFICE VISIT (OUTPATIENT)
Dept: UROLOGY | Facility: CLINIC | Age: 51
End: 2021-05-17
Payer: COMMERCIAL

## 2021-05-17 ENCOUNTER — PATIENT MESSAGE (OUTPATIENT)
Dept: UROLOGY | Facility: CLINIC | Age: 51
End: 2021-05-17

## 2021-05-17 ENCOUNTER — TELEPHONE (OUTPATIENT)
Dept: UROLOGY | Facility: CLINIC | Age: 51
End: 2021-05-17

## 2021-05-17 ENCOUNTER — HOSPITAL ENCOUNTER (OUTPATIENT)
Dept: RADIOLOGY | Facility: HOSPITAL | Age: 51
Discharge: HOME | End: 2021-05-17
Attending: UROLOGY
Payer: COMMERCIAL

## 2021-05-17 VITALS
BODY MASS INDEX: 47.09 KG/M2 | HEART RATE: 100 BPM | SYSTOLIC BLOOD PRESSURE: 108 MMHG | WEIGHT: 293 LBS | HEIGHT: 66 IN | DIASTOLIC BLOOD PRESSURE: 60 MMHG

## 2021-05-17 DIAGNOSIS — N20.0 NEPHROLITHIASIS: Primary | ICD-10-CM

## 2021-05-17 DIAGNOSIS — N20.0 NEPHROLITHIASIS: ICD-10-CM

## 2021-05-17 LAB
BILIRUB SERPL-MCNC: ABNORMAL MG/DL
BLOOD URINE, POC: 50
CLARITY, POC UA: CLEAR
COLOR, POC UA: YELLOW
GLUCOSE UR QL STRIP: ABNORMAL
KETONES UR QL STRIP: ABNORMAL
LEUKOCYTE ESTERASE URINE, POC: ABNORMAL
NITRITE, POC UA: ABNORMAL
PH, POC UA: 5
PROTEIN, POC: ABNORMAL
SPECIFIC GRAVITY, POC UA: 1.02
UROBILINOGEN, POC UA: ABNORMAL

## 2021-05-17 PROCEDURE — 99999 PR PBB SHADOW E&M-EST. PATIENT-LVL IV: CPT | Mod: PBBFAC,,, | Performed by: UROLOGY

## 2021-05-17 PROCEDURE — 81002 URINALYSIS NONAUTO W/O SCOPE: CPT | Mod: S$GLB,,, | Performed by: UROLOGY

## 2021-05-17 PROCEDURE — 74176 CT RENAL STONE STUDY ABD PELVIS WO: ICD-10-PCS | Mod: 26,,, | Performed by: RADIOLOGY

## 2021-05-17 PROCEDURE — 74176 CT ABD & PELVIS W/O CONTRAST: CPT | Mod: 26,,, | Performed by: RADIOLOGY

## 2021-05-17 PROCEDURE — 74176 CT ABD & PELVIS W/O CONTRAST: CPT | Mod: TC

## 2021-05-17 PROCEDURE — 81002 POCT URINE DIPSTICK WITHOUT MICROSCOPE: ICD-10-PCS | Mod: S$GLB,,, | Performed by: UROLOGY

## 2021-05-17 PROCEDURE — 99203 PR OFFICE/OUTPT VISIT, NEW, LEVL III, 30-44 MIN: ICD-10-PCS | Mod: 25,S$GLB,, | Performed by: UROLOGY

## 2021-05-17 PROCEDURE — 99999 PR PBB SHADOW E&M-EST. PATIENT-LVL IV: ICD-10-PCS | Mod: PBBFAC,,, | Performed by: UROLOGY

## 2021-05-17 PROCEDURE — 99203 OFFICE O/P NEW LOW 30 MIN: CPT | Mod: 25,S$GLB,, | Performed by: UROLOGY

## 2021-05-17 RX ORDER — HYDROCODONE BITARTRATE AND ACETAMINOPHEN 5; 325 MG/1; MG/1
1 TABLET ORAL EVERY 4 HOURS PRN
Qty: 15 TABLET | Refills: 0 | Status: SHIPPED | OUTPATIENT
Start: 2021-05-17 | End: 2022-01-18

## 2021-05-18 ENCOUNTER — PATIENT MESSAGE (OUTPATIENT)
Dept: FAMILY MEDICINE | Facility: CLINIC | Age: 51
End: 2021-05-18

## 2021-05-18 ENCOUNTER — HOSPITAL ENCOUNTER (EMERGENCY)
Facility: HOSPITAL | Age: 51
Discharge: HOME | End: 2021-05-18
Attending: EMERGENCY MEDICINE
Payer: COMMERCIAL

## 2021-05-18 VITALS
RESPIRATION RATE: 16 BRPM | WEIGHT: 293 LBS | HEIGHT: 66 IN | DIASTOLIC BLOOD PRESSURE: 75 MMHG | BODY MASS INDEX: 47.09 KG/M2 | TEMPERATURE: 99 F | OXYGEN SATURATION: 100 % | HEART RATE: 78 BPM | SYSTOLIC BLOOD PRESSURE: 129 MMHG

## 2021-05-18 DIAGNOSIS — R06.02 SOB (SHORTNESS OF BREATH): ICD-10-CM

## 2021-05-18 DIAGNOSIS — E87.6 HYPOKALEMIA: ICD-10-CM

## 2021-05-18 DIAGNOSIS — R42 DIZZINESS: ICD-10-CM

## 2021-05-18 DIAGNOSIS — R11.2 NON-INTRACTABLE VOMITING WITH NAUSEA, UNSPECIFIED VOMITING TYPE: ICD-10-CM

## 2021-05-18 DIAGNOSIS — R53.83 FATIGUE, UNSPECIFIED TYPE: Primary | ICD-10-CM

## 2021-05-18 DIAGNOSIS — E86.0 DEHYDRATION: ICD-10-CM

## 2021-05-18 LAB
ALBUMIN SERPL BCP-MCNC: 3.8 G/DL (ref 3.5–5.2)
ALP SERPL-CCNC: 52 U/L (ref 55–135)
ALT SERPL W/O P-5'-P-CCNC: 24 U/L (ref 10–44)
ANION GAP SERPL CALC-SCNC: 16 MMOL/L (ref 8–16)
ANION GAP SERPL CALC-SCNC: 18 MMOL/L (ref 8–16)
AST SERPL-CCNC: 23 U/L (ref 10–40)
BASOPHILS # BLD AUTO: 0.02 K/UL (ref 0–0.2)
BASOPHILS NFR BLD: 0.3 % (ref 0–1.9)
BILIRUB SERPL-MCNC: 0.7 MG/DL (ref 0.1–1)
BILIRUB UR QL STRIP: ABNORMAL
BNP SERPL-MCNC: <10 PG/ML (ref 0–99)
BUN SERPL-MCNC: 23 MG/DL (ref 6–20)
BUN SERPL-MCNC: 25 MG/DL (ref 6–20)
CALCIUM SERPL-MCNC: 8.8 MG/DL (ref 8.7–10.5)
CALCIUM SERPL-MCNC: 9.3 MG/DL (ref 8.7–10.5)
CHLORIDE SERPL-SCNC: 97 MMOL/L (ref 95–110)
CHLORIDE SERPL-SCNC: 99 MMOL/L (ref 95–110)
CLARITY UR: ABNORMAL
CO2 SERPL-SCNC: 24 MMOL/L (ref 23–29)
CO2 SERPL-SCNC: 25 MMOL/L (ref 23–29)
COLOR UR: YELLOW
CREAT SERPL-MCNC: 1.2 MG/DL (ref 0.5–1.4)
CREAT SERPL-MCNC: 1.4 MG/DL (ref 0.5–1.4)
CTP QC/QA: YES
CTP QC/QA: YES
DIFFERENTIAL METHOD: ABNORMAL
EOSINOPHIL # BLD AUTO: 0 K/UL (ref 0–0.5)
EOSINOPHIL NFR BLD: 0.2 % (ref 0–8)
ERYTHROCYTE [DISTWIDTH] IN BLOOD BY AUTOMATED COUNT: 15.1 % (ref 11.5–14.5)
EST. GFR  (AFRICAN AMERICAN): 50 ML/MIN/1.73 M^2
EST. GFR  (AFRICAN AMERICAN): >60 ML/MIN/1.73 M^2
EST. GFR  (NON AFRICAN AMERICAN): 43 ML/MIN/1.73 M^2
EST. GFR  (NON AFRICAN AMERICAN): 52 ML/MIN/1.73 M^2
GLUCOSE SERPL-MCNC: 110 MG/DL (ref 70–110)
GLUCOSE SERPL-MCNC: 93 MG/DL (ref 70–110)
GLUCOSE UR QL STRIP: NEGATIVE
HCT VFR BLD AUTO: 41 % (ref 37–48.5)
HGB BLD-MCNC: 12.8 G/DL (ref 12–16)
HGB UR QL STRIP: NEGATIVE
IMM GRANULOCYTES # BLD AUTO: 0.02 K/UL (ref 0–0.04)
IMM GRANULOCYTES NFR BLD AUTO: 0.3 % (ref 0–0.5)
KETONES UR QL STRIP: ABNORMAL
LEUKOCYTE ESTERASE UR QL STRIP: NEGATIVE
LYMPHOCYTES # BLD AUTO: 1.6 K/UL (ref 1–4.8)
LYMPHOCYTES NFR BLD: 27.4 % (ref 18–48)
MAGNESIUM SERPL-MCNC: 1.8 MG/DL (ref 1.6–2.6)
MAGNESIUM SERPL-MCNC: 1.9 MG/DL (ref 1.6–2.6)
MCH RBC QN AUTO: 25.8 PG (ref 27–31)
MCHC RBC AUTO-ENTMCNC: 31.2 G/DL (ref 32–36)
MCV RBC AUTO: 83 FL (ref 82–98)
MONOCYTES # BLD AUTO: 0.6 K/UL (ref 0.3–1)
MONOCYTES NFR BLD: 10.6 % (ref 4–15)
NEUTROPHILS # BLD AUTO: 3.5 K/UL (ref 1.8–7.7)
NEUTROPHILS NFR BLD: 61.2 % (ref 38–73)
NITRITE UR QL STRIP: NEGATIVE
NRBC BLD-RTO: 0 /100 WBC
PH UR STRIP: 6 [PH] (ref 5–8)
PHOSPHATE SERPL-MCNC: 2.8 MG/DL (ref 2.7–4.5)
PHOSPHATE SERPL-MCNC: 3.2 MG/DL (ref 2.7–4.5)
PLATELET # BLD AUTO: 178 K/UL (ref 150–450)
PMV BLD AUTO: 12.5 FL (ref 9.2–12.9)
POC MOLECULAR INFLUENZA A AGN: NEGATIVE
POC MOLECULAR INFLUENZA B AGN: NEGATIVE
POTASSIUM SERPL-SCNC: 2.9 MMOL/L (ref 3.5–5.1)
POTASSIUM SERPL-SCNC: 3 MMOL/L (ref 3.5–5.1)
PROT SERPL-MCNC: 7.6 G/DL (ref 6–8.4)
PROT UR QL STRIP: NEGATIVE
RBC # BLD AUTO: 4.97 M/UL (ref 4–5.4)
SARS-COV-2 RDRP RESP QL NAA+PROBE: NEGATIVE
SODIUM SERPL-SCNC: 139 MMOL/L (ref 136–145)
SODIUM SERPL-SCNC: 140 MMOL/L (ref 136–145)
SP GR UR STRIP: 1.02 (ref 1–1.03)
TROPONIN I SERPL DL<=0.01 NG/ML-MCNC: 0.01 NG/ML (ref 0–0.03)
URN SPEC COLLECT METH UR: ABNORMAL
UROBILINOGEN UR STRIP-ACNC: NEGATIVE EU/DL
WBC # BLD AUTO: 5.76 K/UL (ref 3.9–12.7)

## 2021-05-18 PROCEDURE — 84100 ASSAY OF PHOSPHORUS: CPT | Mod: 91 | Performed by: EMERGENCY MEDICINE

## 2021-05-18 PROCEDURE — 96375 TX/PRO/DX INJ NEW DRUG ADDON: CPT

## 2021-05-18 PROCEDURE — 96361 HYDRATE IV INFUSION ADD-ON: CPT

## 2021-05-18 PROCEDURE — U0002 COVID-19 LAB TEST NON-CDC: HCPCS | Performed by: NURSE PRACTITIONER

## 2021-05-18 PROCEDURE — 83735 ASSAY OF MAGNESIUM: CPT | Performed by: NURSE PRACTITIONER

## 2021-05-18 PROCEDURE — 83880 ASSAY OF NATRIURETIC PEPTIDE: CPT | Performed by: NURSE PRACTITIONER

## 2021-05-18 PROCEDURE — 96365 THER/PROPH/DIAG IV INF INIT: CPT

## 2021-05-18 PROCEDURE — 93010 EKG 12-LEAD: ICD-10-PCS | Mod: ,,, | Performed by: INTERNAL MEDICINE

## 2021-05-18 PROCEDURE — 84484 ASSAY OF TROPONIN QUANT: CPT | Performed by: NURSE PRACTITIONER

## 2021-05-18 PROCEDURE — 80048 BASIC METABOLIC PNL TOTAL CA: CPT | Performed by: NURSE PRACTITIONER

## 2021-05-18 PROCEDURE — 85025 COMPLETE CBC W/AUTO DIFF WBC: CPT | Performed by: NURSE PRACTITIONER

## 2021-05-18 PROCEDURE — 93005 ELECTROCARDIOGRAM TRACING: CPT

## 2021-05-18 PROCEDURE — 63600175 PHARM REV CODE 636 W HCPCS: Performed by: NURSE PRACTITIONER

## 2021-05-18 PROCEDURE — 25000003 PHARM REV CODE 250: Performed by: NURSE PRACTITIONER

## 2021-05-18 PROCEDURE — 80053 COMPREHEN METABOLIC PANEL: CPT | Performed by: NURSE PRACTITIONER

## 2021-05-18 PROCEDURE — 99285 EMERGENCY DEPT VISIT HI MDM: CPT | Mod: 25

## 2021-05-18 PROCEDURE — 83735 ASSAY OF MAGNESIUM: CPT | Mod: 91 | Performed by: EMERGENCY MEDICINE

## 2021-05-18 PROCEDURE — 81003 URINALYSIS AUTO W/O SCOPE: CPT | Performed by: NURSE PRACTITIONER

## 2021-05-18 PROCEDURE — 84100 ASSAY OF PHOSPHORUS: CPT | Performed by: NURSE PRACTITIONER

## 2021-05-18 PROCEDURE — 93010 ELECTROCARDIOGRAM REPORT: CPT | Mod: ,,, | Performed by: INTERNAL MEDICINE

## 2021-05-18 RX ORDER — ONDANSETRON 4 MG/1
4 TABLET, FILM COATED ORAL EVERY 6 HOURS PRN
Qty: 12 TABLET | Refills: 0 | Status: SHIPPED | OUTPATIENT
Start: 2021-05-18 | End: 2022-01-18

## 2021-05-18 RX ORDER — PROMETHAZINE HYDROCHLORIDE 25 MG/1
25 TABLET ORAL EVERY 6 HOURS PRN
Qty: 15 TABLET | Refills: 0 | Status: SHIPPED | OUTPATIENT
Start: 2021-05-18 | End: 2022-01-18

## 2021-05-18 RX ORDER — POTASSIUM CHLORIDE 20 MEQ/1
20 TABLET, EXTENDED RELEASE ORAL
Status: COMPLETED | OUTPATIENT
Start: 2021-05-18 | End: 2021-05-18

## 2021-05-18 RX ORDER — ONDANSETRON 2 MG/ML
4 INJECTION INTRAMUSCULAR; INTRAVENOUS
Status: COMPLETED | OUTPATIENT
Start: 2021-05-18 | End: 2021-05-18

## 2021-05-18 RX ORDER — POTASSIUM CHLORIDE 7.45 MG/ML
10 INJECTION INTRAVENOUS
Status: COMPLETED | OUTPATIENT
Start: 2021-05-18 | End: 2021-05-18

## 2021-05-18 RX ADMIN — SODIUM CHLORIDE 1000 ML: 0.9 INJECTION, SOLUTION INTRAVENOUS at 09:05

## 2021-05-18 RX ADMIN — POTASSIUM CHLORIDE 20 MEQ: 1500 TABLET, EXTENDED RELEASE ORAL at 11:05

## 2021-05-18 RX ADMIN — ONDANSETRON 4 MG: 2 INJECTION INTRAMUSCULAR; INTRAVENOUS at 09:05

## 2021-05-18 RX ADMIN — SODIUM CHLORIDE 500 ML: 0.9 INJECTION, SOLUTION INTRAVENOUS at 11:05

## 2021-05-18 RX ADMIN — POTASSIUM CHLORIDE 10 MEQ: 7.46 INJECTION, SOLUTION INTRAVENOUS at 11:05

## 2021-06-05 NOTE — TELEPHONE ENCOUNTER
Subjective  Ingrid Jaramillo is a 57 year old female.    Chief Complaint   Patient presents with   • Annual Exam   • Office Visit     This is patient's first visit to this facility.  Patient is being followed at [facility] endocrine and has a primary physician Dr. Alvarenga who she states does not have convenient hours and will be transferring here.  Patient is currently on Metformin for control of diabetes blood sugars in the 120 range no low blood sugars on oral hypoglycemic.  No complaints of any claudication she really is not exercising at this time no ulcers on her feet.  No complaints of any polyuria or polydipsia.  She prefers to see the endocrinologist at [facility].  Patient states she does have a few concerns she has a recurrent rash has used over-the-counter hydrocortisone not helping was told she had eczema has also tried clobetasol not helped.  She would like to see a dermatologist.  Rash is mostly different places.  No rash on her face.  Patient also complaining of chronic nasal congestion states that she was told many years ago that she had a deviated nasal septum.  She has tried over-the-counter Claritin not really helping.  She thinks she may have sinus infection but no complaints of any yellowish thick greenish secretions some congestion and runny nose itchy eyes.  No complaints of any cough no sore throat no fever no chills.  No complaints of loss of sense of taste or smell.  No complaints of any chest pain no shortness of breath no orthopnea no PND.  Overall weight is stable no problems with her bowels habits.  She is not sure when she had a colonoscopy will have to check when she did and also her mammogram and also her previous immunization I did tell her that we will need records transferred over here        History reviewed. No pertinent past medical history.      History reviewed. No pertinent surgical history.      Current Outpatient Medications   Medication Sig Dispense  confirmed   Refill   • Alcohol Swabs (Alcohol Wipes) 70 % Pads 4 times daily.     • metFORMIN (GLUCOPHAGE-XR) 500 MG 24 hr tablet Take 200 mg by mouth 2 times daily.     • fluticasone (FLONASE) 50 MCG/ACT nasal spray Spray 1 spray in each nostril daily.     • atorvastatin (LIPITOR) 40 MG tablet Take 40 mg by mouth daily.     • blood glucose test strip Use as instructed     • loratadine (CLARITIN) 10 MG tablet Take 10 mg by mouth daily.     • fluticasone (FLONASE) 50 MCG/ACT nasal spray Spray 2 sprays in each nostril daily. 16 g 3   • fexofenadine (ALLEGRA) 180 MG tablet Take 1 tablet by mouth daily. 30 tablet 5     No current facility-administered medications for this visit.         ALLERGIES:   Allergen Reactions   • Peanut   (Food Or Med) HIVES         Family History   Problem Relation Age of Onset   • Patient is unaware of any medical problems Mother    • Patient is unaware of any medical problems Father           Social History     Tobacco Use   • Smoking status: Current Every Day Smoker     Packs/day: 0.25   • Smokeless tobacco: Never Used   Substance Use Topics   • Alcohol use: Not Currently   • Drug use: Never       Review of Systems   Constitutional: Negative.    HENT: Negative.    Eyes: Negative.    Respiratory: Negative.    Cardiovascular: Negative.    Gastrointestinal: Negative.    Endocrine: Negative.    Genitourinary: Negative.    Musculoskeletal: Positive for arthralgias.   Skin: Positive for rash.   Allergic/Immunologic: Negative.    Neurological: Negative.    Hematological: Negative.    Psychiatric/Behavioral: Negative.        Objective  Visit Vitals  /71 (BP Location: LUE - Left upper extremity, Patient Position: Sitting, Cuff Size: Regular)   Pulse 77   Resp 17   Ht 5' (1.524 m)   Wt 72.6 kg (159 lb 15.1 oz)   LMP  (LMP Unknown)   SpO2 95%   BMI 31.24 kg/m²       Physical Exam  Vitals reviewed.   Constitutional:       General: She is not in acute distress.     Appearance: Normal appearance. She is  well-developed. She is not ill-appearing.   Eyes:      Conjunctiva/sclera: Conjunctivae normal.   Cardiovascular:      Rate and Rhythm: Normal rate and regular rhythm.      Heart sounds: No murmur.   Pulmonary:      Effort: Pulmonary effort is normal.      Breath sounds: Normal breath sounds.   Abdominal:      General: Bowel sounds are normal. There is no distension.      Palpations: Abdomen is soft.   Musculoskeletal:         General: No swelling or tenderness. Normal range of motion.      Cervical back: Normal range of motion and neck supple.   Skin:     General: Skin is dry.      Findings: No erythema or rash.   Neurological:      Mental Status: She is oriented to person, place, and time.   Psychiatric:         Mood and Affect: Mood normal.         Labs  Lab Results Reviewed, No results found for: SODIUM, POTASSIUM, CHLORIDE, CO2, BUN, CREATININE, GLUCOSE, No results found for: WBC, HCT, HGB, PLT, No results found for: HGBA1C, No results found for: TSH, No results found for: CHOLESTEROL, HDL, CALCLDL, TRIGLYCERIDE, No results found for: AST, GPT, GGTP, ALKPT, BILIRUBIN, No results found for: COL, UAPP, USPG, UPH, UPROT, UGLU, UKET, UBILI, URBC, UNITR, UROB, UWBC,   EKG INTERPRETATION:  No results found for this or any previous visit. and   IMAGING STUDIES:  No results found for this or any previous visit.    Imaging  No image results found.      Assessment and Plan    Type 2 diabetes mellitus without complication, without long-term current use of insulin (CMS/[facility])  - MICROALBUMIN URINE RANDOM  - GLYCOHEMOGLOBIN; Future  - SERVICE TO OPHTHALMOLOGY    Mixed hyperlipidemia    Colon cancer screening    Health care maintenance  - CBC WITH DIFFERENTIAL; Future  - COMPREHENSIVE METABOLIC PANEL; Future  - LIPID PANEL WITH REFLEX; Future  - THYROID STIMULATING HORMONE; Future  - PNEUMOCOCCAL POLYSACCHARIDE ADULT VACC, IM/SQ (PNEUMOVAX 23)    Other atopic dermatitis  - SERVICE TO DERMATOLOGY    Chronic nasal congestion  -  SERVICE TO ENT  2 diabetes mellitus without complication without long-term use of insulin continue same treatment encouraged to see diabetic educator for better dietary input annual diabetic panel annual eye exam.  She would like to follow-up with the same endocrinologist at [facility] I did tell her that I could not give her 1 referral to out of network.      Chronic nasal congestion likely has allergic rhinitis I did tell her to try Flonase nasal spray 2 squirts each nostril and also fexofenadine 180 mg p.o. daily if symptoms still persist see ENT for ruling out nasal septum deviation      Chronic atopic dermatitis I did tell her that she could try triamcinolone 0.1% cream to the affected area twice daily if symptoms still persist see dermatology avoid soap lotion and detergent with fragrance and dye use Cetaphil and use Tide Free and clear detergent      Patient will also receive her Pneumovax today.  She is also recommended to obtain the Shingrix vaccine wants to defer till next visit  ..............  Orders Placed This Encounter   • PNEUMOCOCCAL POLYSACCHARIDE ADULT VACC, IM/SQ (PNEUMOVAX 23)   • CBC with Automated Differential   • Comprehensive Metabolic Panel   • Lipid Panel With Reflex   • Thyroid Stimulating Hormone   • Microalbumin Urine Random   • Glycohemoglobin   • SERVICE TO OPHTHALMOLOGY     Annual DM     Referral Priority:   Routine     Referral Type:   Consult & Treatment     Number of Visits Requested:   1     Expiration Date:   6/5/2022   • SERVICE TO ENT     Patient with chronic nasal congestion no help for rhinitis with current regimen     Referral Priority:   Routine     Referral Type:   Consult & Treatment     Number of Visits Requested:   1     Expiration Date:   6/5/2022   • SERVICE TO DERMATOLOGY     Please evaluate and treat ongoing rash     Referral Priority:   Routine     Referral Type:   Consult & Treatment     Number of Visits Requested:   1     Expiration Date:   6/5/2022    • fluticasone (FLONASE) 50 MCG/ACT nasal spray     Sig: Spray 2 sprays in each nostril daily.     Dispense:  16 g     Refill:  3   • fexofenadine (ALLEGRA) 180 MG tablet     Sig: Take 1 tablet by mouth daily.     Dispense:  30 tablet     Refill:  5       Preventive health maintenance  Immunization recommendations reviewed  Cancer screening recommendations reviewed  Preventive diet and exercise related lifestyle changes discussed and recommended.  Weight goal was reviewed and discussed  Injury prevention counseling and completed seatbelts, helmets ,smoke detectors safe storage of fire arms, Internet safety and sun safety discussed.  Counseled on reduced caffeine intake.  Counseled on safe sex practices.    Adequate calcium and vitamin D intake reviewed and recommended.  Osteoporosis screening reviewed.   Self breast exam taught and recommended    6/7/2021  Donna Day MD

## 2021-07-19 ENCOUNTER — PATIENT OUTREACH (OUTPATIENT)
Dept: ADMINISTRATIVE | Facility: OTHER | Age: 51
End: 2021-07-19

## 2021-07-19 DIAGNOSIS — Z12.31 ENCOUNTER FOR SCREENING MAMMOGRAM FOR BREAST CANCER: Primary | ICD-10-CM

## 2021-08-23 ENCOUNTER — PATIENT MESSAGE (OUTPATIENT)
Dept: FAMILY MEDICINE | Facility: CLINIC | Age: 51
End: 2021-08-23

## 2021-08-24 ENCOUNTER — LAB VISIT (OUTPATIENT)
Dept: LAB | Facility: HOSPITAL | Age: 51
End: 2021-08-24
Attending: REGISTERED NURSE
Payer: COMMERCIAL

## 2021-08-24 DIAGNOSIS — E78.5 HYPERLIPIDEMIA, UNSPECIFIED HYPERLIPIDEMIA TYPE: ICD-10-CM

## 2021-08-24 DIAGNOSIS — E55.9 VITAMIN D DEFICIENCY DISEASE: ICD-10-CM

## 2021-08-24 DIAGNOSIS — I10 HYPERTENSION, UNSPECIFIED TYPE: ICD-10-CM

## 2021-08-24 DIAGNOSIS — Z90.3 HISTORY OF SLEEVE GASTRECTOMY: Primary | ICD-10-CM

## 2021-08-24 DIAGNOSIS — Z90.3 HISTORY OF SLEEVE GASTRECTOMY: ICD-10-CM

## 2021-08-24 LAB
ERYTHROCYTE [DISTWIDTH] IN BLOOD BY AUTOMATED COUNT: 14.4 % (ref 11.5–14.5)
FERRITIN SERPL-MCNC: 159 NG/ML (ref 20–300)
HCT VFR BLD AUTO: 44.4 % (ref 37–48.5)
HGB BLD-MCNC: 13.8 G/DL (ref 12–16)
IRON SERPL-MCNC: 40 UG/DL (ref 30–160)
MCH RBC QN AUTO: 25.9 PG (ref 27–31)
MCHC RBC AUTO-ENTMCNC: 31.1 G/DL (ref 32–36)
MCV RBC AUTO: 84 FL (ref 82–98)
PLATELET # BLD AUTO: 178 K/UL (ref 150–450)
PMV BLD AUTO: 13.2 FL (ref 9.2–12.9)
RBC # BLD AUTO: 5.32 M/UL (ref 4–5.4)
SATURATED IRON: 11 % (ref 20–50)
TOTAL IRON BINDING CAPACITY: 361 UG/DL (ref 250–450)
TRANSFERRIN SERPL-MCNC: 244 MG/DL (ref 200–375)
VIT B12 SERPL-MCNC: 1196 PG/ML (ref 210–950)
WBC # BLD AUTO: 5.56 K/UL (ref 3.9–12.7)

## 2021-08-24 PROCEDURE — 82728 ASSAY OF FERRITIN: CPT | Performed by: REGISTERED NURSE

## 2021-08-24 PROCEDURE — 82607 VITAMIN B-12: CPT | Performed by: REGISTERED NURSE

## 2021-08-24 PROCEDURE — 85027 COMPLETE CBC AUTOMATED: CPT | Performed by: REGISTERED NURSE

## 2021-08-24 PROCEDURE — 84425 ASSAY OF VITAMIN B-1: CPT | Performed by: REGISTERED NURSE

## 2021-08-24 PROCEDURE — 36415 COLL VENOUS BLD VENIPUNCTURE: CPT | Mod: PO | Performed by: REGISTERED NURSE

## 2021-08-24 PROCEDURE — 83540 ASSAY OF IRON: CPT | Performed by: REGISTERED NURSE

## 2021-08-24 RX ORDER — ERGOCALCIFEROL 1.25 MG/1
CAPSULE ORAL
Qty: 25 CAPSULE | Refills: 0 | OUTPATIENT
Start: 2021-08-24

## 2021-08-24 RX ORDER — OLMESARTAN MEDOXOMIL AND HYDROCHLOROTHIAZIDE 40/25 40; 25 MG/1; MG/1
1 TABLET ORAL DAILY
Qty: 30 TABLET | Refills: 0 | Status: SHIPPED | OUTPATIENT
Start: 2021-08-24 | End: 2021-11-08

## 2021-08-24 RX ORDER — SIMVASTATIN 20 MG/1
20 TABLET, FILM COATED ORAL NIGHTLY
Qty: 30 TABLET | Refills: 0 | Status: SHIPPED | OUTPATIENT
Start: 2021-08-24 | End: 2022-01-18

## 2021-08-25 ENCOUNTER — PATIENT MESSAGE (OUTPATIENT)
Dept: FAMILY MEDICINE | Facility: CLINIC | Age: 51
End: 2021-08-25

## 2021-08-27 LAB — VIT B1 BLD-MCNC: 66 UG/L (ref 38–122)

## 2021-10-26 ENCOUNTER — IMMUNIZATION (OUTPATIENT)
Dept: PRIMARY CARE CLINIC | Facility: CLINIC | Age: 51
End: 2021-10-26
Payer: COMMERCIAL

## 2021-10-26 DIAGNOSIS — Z23 NEED FOR VACCINATION: Primary | ICD-10-CM

## 2021-10-26 PROCEDURE — 0003A COVID-19, MRNA, LNP-S, PF, 30 MCG/0.3 ML DOSE VACCINE: CPT | Mod: PBBFAC,PN

## 2021-10-26 PROCEDURE — 91300 COVID-19, MRNA, LNP-S, PF, 30 MCG/0.3 ML DOSE VACCINE: CPT | Mod: PBBFAC,PN

## 2021-10-28 ENCOUNTER — PATIENT MESSAGE (OUTPATIENT)
Dept: ADMINISTRATIVE | Facility: HOSPITAL | Age: 51
End: 2021-10-28
Payer: COMMERCIAL

## 2021-10-29 ENCOUNTER — PATIENT OUTREACH (OUTPATIENT)
Dept: ADMINISTRATIVE | Facility: HOSPITAL | Age: 51
End: 2021-10-29
Payer: COMMERCIAL

## 2021-11-18 ENCOUNTER — PATIENT MESSAGE (OUTPATIENT)
Dept: FAMILY MEDICINE | Facility: CLINIC | Age: 51
End: 2021-11-18
Payer: COMMERCIAL

## 2021-11-18 ENCOUNTER — PATIENT MESSAGE (OUTPATIENT)
Dept: ADMINISTRATIVE | Facility: HOSPITAL | Age: 51
End: 2021-11-18
Payer: COMMERCIAL

## 2021-11-21 ENCOUNTER — PATIENT MESSAGE (OUTPATIENT)
Dept: FAMILY MEDICINE | Facility: CLINIC | Age: 51
End: 2021-11-21
Payer: COMMERCIAL

## 2021-12-08 ENCOUNTER — LAB VISIT (OUTPATIENT)
Dept: LAB | Facility: HOSPITAL | Age: 51
End: 2021-12-08
Attending: SPECIALIST
Payer: COMMERCIAL

## 2021-12-08 DIAGNOSIS — R79.89 ABNORMAL CBC: ICD-10-CM

## 2021-12-08 DIAGNOSIS — D50.9 IRON DEFICIENCY ANEMIA, UNSPECIFIED: ICD-10-CM

## 2021-12-08 DIAGNOSIS — D51.9 VITAMIN B12 DEFICIENCY ANEMIA: ICD-10-CM

## 2021-12-08 DIAGNOSIS — D51.8 OTHER VITAMIN B12 DEFICIENCY ANEMIA: ICD-10-CM

## 2021-12-08 DIAGNOSIS — R79.89 ABNORMAL CBC: Primary | ICD-10-CM

## 2021-12-08 LAB
ERYTHROCYTE [DISTWIDTH] IN BLOOD BY AUTOMATED COUNT: 15.6 % (ref 11.5–14.5)
FERRITIN SERPL-MCNC: 179 NG/ML (ref 20–300)
HCT VFR BLD AUTO: 42.2 % (ref 37–48.5)
HGB BLD-MCNC: 13 G/DL (ref 12–16)
IRON SERPL-MCNC: 34 UG/DL (ref 30–160)
MCH RBC QN AUTO: 25.5 PG (ref 27–31)
MCHC RBC AUTO-ENTMCNC: 30.8 G/DL (ref 32–36)
MCV RBC AUTO: 83 FL (ref 82–98)
PLATELET # BLD AUTO: 200 K/UL (ref 150–450)
PMV BLD AUTO: 12.5 FL (ref 9.2–12.9)
RBC # BLD AUTO: 5.09 M/UL (ref 4–5.4)
SATURATED IRON: 9 % (ref 20–50)
TOTAL IRON BINDING CAPACITY: 360 UG/DL (ref 250–450)
TRANSFERRIN SERPL-MCNC: 243 MG/DL (ref 200–375)
VIT B12 SERPL-MCNC: 1123 PG/ML (ref 210–950)
WBC # BLD AUTO: 6.76 K/UL (ref 3.9–12.7)

## 2021-12-08 PROCEDURE — 36415 COLL VENOUS BLD VENIPUNCTURE: CPT | Mod: PO | Performed by: SPECIALIST

## 2021-12-08 PROCEDURE — 82728 ASSAY OF FERRITIN: CPT | Performed by: SPECIALIST

## 2021-12-08 PROCEDURE — 84466 ASSAY OF TRANSFERRIN: CPT | Performed by: SPECIALIST

## 2021-12-08 PROCEDURE — 82607 VITAMIN B-12: CPT | Performed by: SPECIALIST

## 2021-12-08 PROCEDURE — 85027 COMPLETE CBC AUTOMATED: CPT | Performed by: SPECIALIST

## 2021-12-08 PROCEDURE — 84425 ASSAY OF VITAMIN B-1: CPT | Performed by: SPECIALIST

## 2021-12-09 ENCOUNTER — TELEPHONE (OUTPATIENT)
Dept: FAMILY MEDICINE | Facility: CLINIC | Age: 51
End: 2021-12-09
Payer: COMMERCIAL

## 2021-12-09 DIAGNOSIS — Z12.11 SCREENING FOR COLON CANCER: Primary | ICD-10-CM

## 2021-12-11 ENCOUNTER — PATIENT MESSAGE (OUTPATIENT)
Dept: FAMILY MEDICINE | Facility: CLINIC | Age: 51
End: 2021-12-11
Payer: COMMERCIAL

## 2021-12-14 LAB — VIT B1 BLD-MCNC: 67 UG/L (ref 38–122)

## 2022-01-10 ENCOUNTER — PATIENT MESSAGE (OUTPATIENT)
Dept: FAMILY MEDICINE | Facility: CLINIC | Age: 52
End: 2022-01-10
Payer: COMMERCIAL

## 2022-01-18 ENCOUNTER — OFFICE VISIT (OUTPATIENT)
Dept: FAMILY MEDICINE | Facility: CLINIC | Age: 52
End: 2022-01-18
Payer: COMMERCIAL

## 2022-01-18 ENCOUNTER — PATIENT MESSAGE (OUTPATIENT)
Dept: PULMONOLOGY | Facility: CLINIC | Age: 52
End: 2022-01-18
Payer: COMMERCIAL

## 2022-01-18 ENCOUNTER — LAB VISIT (OUTPATIENT)
Dept: LAB | Facility: HOSPITAL | Age: 52
End: 2022-01-18
Attending: FAMILY MEDICINE
Payer: COMMERCIAL

## 2022-01-18 VITALS
OXYGEN SATURATION: 96 % | TEMPERATURE: 98 F | WEIGHT: 272.5 LBS | HEIGHT: 66 IN | DIASTOLIC BLOOD PRESSURE: 86 MMHG | HEART RATE: 110 BPM | SYSTOLIC BLOOD PRESSURE: 110 MMHG | BODY MASS INDEX: 43.79 KG/M2

## 2022-01-18 DIAGNOSIS — E88.819 INSULIN RESISTANCE: ICD-10-CM

## 2022-01-18 DIAGNOSIS — Z00.00 ANNUAL PHYSICAL EXAM: ICD-10-CM

## 2022-01-18 DIAGNOSIS — E78.2 MIXED HYPERLIPIDEMIA: ICD-10-CM

## 2022-01-18 DIAGNOSIS — Z23 NEED FOR TD VACCINE: ICD-10-CM

## 2022-01-18 DIAGNOSIS — E55.9 VITAMIN D DEFICIENCY DISEASE: ICD-10-CM

## 2022-01-18 DIAGNOSIS — I10 HYPERTENSION, UNSPECIFIED TYPE: ICD-10-CM

## 2022-01-18 DIAGNOSIS — I73.9 PVD (PERIPHERAL VASCULAR DISEASE): ICD-10-CM

## 2022-01-18 DIAGNOSIS — G47.33 OSA (OBSTRUCTIVE SLEEP APNEA): ICD-10-CM

## 2022-01-18 DIAGNOSIS — M19.90 ARTHRITIS: ICD-10-CM

## 2022-01-18 DIAGNOSIS — E66.01 MORBID OBESITY WITH BMI OF 40.0-44.9, ADULT: ICD-10-CM

## 2022-01-18 DIAGNOSIS — Z78.0 POSTMENOPAUSAL: ICD-10-CM

## 2022-01-18 DIAGNOSIS — Z23 NEED FOR INFLUENZA VACCINATION: ICD-10-CM

## 2022-01-18 DIAGNOSIS — R73.03 PREDIABETES: ICD-10-CM

## 2022-01-18 DIAGNOSIS — Z00.00 ANNUAL PHYSICAL EXAM: Primary | ICD-10-CM

## 2022-01-18 DIAGNOSIS — Z23 NEED FOR SHINGLES VACCINE: ICD-10-CM

## 2022-01-18 PROBLEM — N95.1 PERIMENOPAUSAL: Status: RESOLVED | Noted: 2020-08-04 | Resolved: 2022-01-18

## 2022-01-18 LAB
ALBUMIN SERPL BCP-MCNC: 4.3 G/DL (ref 3.5–5.2)
ALP SERPL-CCNC: 73 U/L (ref 55–135)
ALT SERPL W/O P-5'-P-CCNC: 11 U/L (ref 10–44)
ANION GAP SERPL CALC-SCNC: 12 MMOL/L (ref 8–16)
AST SERPL-CCNC: 15 U/L (ref 10–40)
BILIRUB SERPL-MCNC: 0.5 MG/DL (ref 0.1–1)
BUN SERPL-MCNC: 34 MG/DL (ref 6–20)
CALCIUM SERPL-MCNC: 10.3 MG/DL (ref 8.7–10.5)
CHLORIDE SERPL-SCNC: 101 MMOL/L (ref 95–110)
CHOLEST SERPL-MCNC: 215 MG/DL (ref 120–199)
CHOLEST/HDLC SERPL: 3 {RATIO} (ref 2–5)
CO2 SERPL-SCNC: 25 MMOL/L (ref 23–29)
CREAT SERPL-MCNC: 1.3 MG/DL (ref 0.5–1.4)
EST. GFR  (AFRICAN AMERICAN): 54.1 ML/MIN/1.73 M^2
EST. GFR  (NON AFRICAN AMERICAN): 47 ML/MIN/1.73 M^2
ESTIMATED AVG GLUCOSE: 111 MG/DL (ref 68–131)
GLUCOSE SERPL-MCNC: 98 MG/DL (ref 70–110)
HBA1C MFR BLD: 5.5 % (ref 4–5.6)
HDLC SERPL-MCNC: 72 MG/DL (ref 40–75)
HDLC SERPL: 33.5 % (ref 20–50)
INSULIN COLLECTION INTERVAL: NORMAL
INSULIN SERPL-ACNC: 11.3 UU/ML
LDLC SERPL CALC-MCNC: 128.2 MG/DL (ref 63–159)
NONHDLC SERPL-MCNC: 143 MG/DL
POTASSIUM SERPL-SCNC: 3.8 MMOL/L (ref 3.5–5.1)
PROT SERPL-MCNC: 8.3 G/DL (ref 6–8.4)
SODIUM SERPL-SCNC: 138 MMOL/L (ref 136–145)
TRIGL SERPL-MCNC: 74 MG/DL (ref 30–150)
TSH SERPL DL<=0.005 MIU/L-ACNC: 3.69 UIU/ML (ref 0.4–4)

## 2022-01-18 PROCEDURE — 90472 ZOSTER RECOMBINANT VACCINE: ICD-10-PCS | Mod: S$GLB,,, | Performed by: FAMILY MEDICINE

## 2022-01-18 PROCEDURE — 90714 TD VACC NO PRESV 7 YRS+ IM: CPT | Mod: S$GLB,,, | Performed by: FAMILY MEDICINE

## 2022-01-18 PROCEDURE — 3079F DIAST BP 80-89 MM HG: CPT | Mod: CPTII,S$GLB,, | Performed by: FAMILY MEDICINE

## 2022-01-18 PROCEDURE — 90471 IMMUNIZATION ADMIN: CPT | Mod: S$GLB,,, | Performed by: FAMILY MEDICINE

## 2022-01-18 PROCEDURE — 90686 FLU VACCINE (QUAD) GREATER THAN OR EQUAL TO 3YO PRESERVATIVE FREE IM: ICD-10-PCS | Mod: S$GLB,,, | Performed by: FAMILY MEDICINE

## 2022-01-18 PROCEDURE — 90750 HZV VACC RECOMBINANT IM: CPT | Mod: S$GLB,,, | Performed by: FAMILY MEDICINE

## 2022-01-18 PROCEDURE — 99396 PR PREVENTIVE VISIT,EST,40-64: ICD-10-PCS | Mod: 25,S$GLB,, | Performed by: FAMILY MEDICINE

## 2022-01-18 PROCEDURE — 1159F PR MEDICATION LIST DOCUMENTED IN MEDICAL RECORD: ICD-10-PCS | Mod: CPTII,S$GLB,, | Performed by: FAMILY MEDICINE

## 2022-01-18 PROCEDURE — 3074F SYST BP LT 130 MM HG: CPT | Mod: CPTII,S$GLB,, | Performed by: FAMILY MEDICINE

## 2022-01-18 PROCEDURE — 85025 COMPLETE CBC W/AUTO DIFF WBC: CPT | Performed by: FAMILY MEDICINE

## 2022-01-18 PROCEDURE — 3074F PR MOST RECENT SYSTOLIC BLOOD PRESSURE < 130 MM HG: ICD-10-PCS | Mod: CPTII,S$GLB,, | Performed by: FAMILY MEDICINE

## 2022-01-18 PROCEDURE — 3008F BODY MASS INDEX DOCD: CPT | Mod: CPTII,S$GLB,, | Performed by: FAMILY MEDICINE

## 2022-01-18 PROCEDURE — 90686 IIV4 VACC NO PRSV 0.5 ML IM: CPT | Mod: S$GLB,,, | Performed by: FAMILY MEDICINE

## 2022-01-18 PROCEDURE — 99999 PR PBB SHADOW E&M-EST. PATIENT-LVL IV: CPT | Mod: PBBFAC,,, | Performed by: FAMILY MEDICINE

## 2022-01-18 PROCEDURE — 1159F MED LIST DOCD IN RCRD: CPT | Mod: CPTII,S$GLB,, | Performed by: FAMILY MEDICINE

## 2022-01-18 PROCEDURE — 80053 COMPREHEN METABOLIC PANEL: CPT | Performed by: FAMILY MEDICINE

## 2022-01-18 PROCEDURE — 3079F PR MOST RECENT DIASTOLIC BLOOD PRESSURE 80-89 MM HG: ICD-10-PCS | Mod: CPTII,S$GLB,, | Performed by: FAMILY MEDICINE

## 2022-01-18 PROCEDURE — 90714 TD VACCINE GREATER THAN OR EQUAL TO 7YO PRESERVATIVE FREE IM: ICD-10-PCS | Mod: S$GLB,,, | Performed by: FAMILY MEDICINE

## 2022-01-18 PROCEDURE — 83036 HEMOGLOBIN GLYCOSYLATED A1C: CPT | Performed by: FAMILY MEDICINE

## 2022-01-18 PROCEDURE — 3008F PR BODY MASS INDEX (BMI) DOCUMENTED: ICD-10-PCS | Mod: CPTII,S$GLB,, | Performed by: FAMILY MEDICINE

## 2022-01-18 PROCEDURE — 80061 LIPID PANEL: CPT | Performed by: FAMILY MEDICINE

## 2022-01-18 PROCEDURE — 84443 ASSAY THYROID STIM HORMONE: CPT | Performed by: FAMILY MEDICINE

## 2022-01-18 PROCEDURE — 81001 URINALYSIS AUTO W/SCOPE: CPT | Performed by: FAMILY MEDICINE

## 2022-01-18 PROCEDURE — 90750 ZOSTER RECOMBINANT VACCINE: ICD-10-PCS | Mod: S$GLB,,, | Performed by: FAMILY MEDICINE

## 2022-01-18 PROCEDURE — 90471 FLU VACCINE (QUAD) GREATER THAN OR EQUAL TO 3YO PRESERVATIVE FREE IM: ICD-10-PCS | Mod: S$GLB,,, | Performed by: FAMILY MEDICINE

## 2022-01-18 PROCEDURE — 90472 IMMUNIZATION ADMIN EACH ADD: CPT | Mod: S$GLB,,, | Performed by: FAMILY MEDICINE

## 2022-01-18 PROCEDURE — 99999 PR PBB SHADOW E&M-EST. PATIENT-LVL IV: ICD-10-PCS | Mod: PBBFAC,,, | Performed by: FAMILY MEDICINE

## 2022-01-18 PROCEDURE — 83525 ASSAY OF INSULIN: CPT | Performed by: FAMILY MEDICINE

## 2022-01-18 PROCEDURE — 99396 PREV VISIT EST AGE 40-64: CPT | Mod: 25,S$GLB,, | Performed by: FAMILY MEDICINE

## 2022-01-18 NOTE — PROGRESS NOTES
HISTORY OF PRESENT ILLNESS: Ms. Sagastume who comes in today for annual wellness examination. She is fasting and has not taken medication today.      END OF LIFE DECISION: She does not have a living will and does desire life support.     Current Outpatient Medications   Medication Sig    diclofenac sodium (VOLTAREN) 1 % Gel Apply 2 g topically 4 (four) times daily as needed.    ergocalciferol (ERGOCALCIFEROL) 50,000 unit Cap TAKE 1 CAPSULE BY MOUTH TWICE A WEEK ON MONDAYS AND FRIDAYS    meloxicam (MOBIC) 15 MG tablet TAKE 1 TABLET(15 MG) BY MOUTH EVERY DAY    multivitamin capsule Take 1 capsule by mouth once daily.    olmesartan-hydrochlorothiazide (BENICAR HCT) 40-25 mg per tablet TAKE 1 TABLET BY MOUTH EVERY DAY    simvastatin (ZOCOR) 20 MG tablet Take 1 tablet (20 mg total) by mouth every evening.    cyclobenzaprine (FLEXERIL) 10 MG tablet TAKE 1 TABLET(10 MG) BY MOUTH EVERY EVENING AS NEEDED FOR MUSCLE SPASMS (Patient not taking: Reported on 1/18/2022)     SCREENINGS:   Cholesterol: August 11, 2020.  FFS/Colonoscopy: Never. December 9, 2021 FIT-KIT - okay.  Mammogram: August 11, 2020 - okay. Scheduled for January 25, 2022.  GYN Exam (breast): July 25, 2018 - okay. She states she saw GYN appx. 4 years ago for pap smear - okay per patient. She states she will schedule this year with GYN Dr. Sarita Heaton.  Dexa Scan: Never.  Eye Exam: Never. 2 - 3 years ago per patient.  She wears reading glasses for the computer.  PPD: Never.    Immunizations: Td/Tdap - more than 10 years ago; she desires.  Gardasil - N./A.  Zostavax - N./A.  Shingrix - Never. Reports no history of varicella or zoster. However, August 11, 2020 varicella zoster titer was positive.  She desires.   Pneumovax - never.  Seasonal Flu - She desires.     ROS:  GENERAL: Denies fever, chills, fatigue. Appetite normal. Weight 171.9 kg (378 lb 15.5 oz) at March 11, 2021 visit. Not currently exercises since the holidays. Monitors diet. Reports s/p  gastric sleeve on 4/22/2021.  SKIN: Denies rashes, itching, changes in mole, color or texture of skin or easy bruising.  HEAD:  Denies headaches or recent head trauma.  EYES: Denies change in vision, pain, diplopia, redness or discharge.  EARS: Denies ear pain, discharge, vertigo or decreased hearing.  NOSE: Denies loss of smell, epistaxis or rhinitis. But reports Tylenol sinus medication helps with sinus symptoms without raising blood pressure.  MOUTH & THROAT: Denies hoarseness or change in voice. Denies excessive gum bleeding or mouth sores.  Denies sore throat.  NODES: Denies swollen glands.  CHEST: Denies wheezing, cough, shortness of breath or sputum production. Saw NP Lejeune with pulmonary on May 29, 90146 for SHAWNA on CPAP with 1-year follow up advised. Does not use CPAP as should but reports received replacement equipment and will start using it.  CARDIOVASCULAR: Denies chest pain, PND, orthopnea or reduced exercise tolerance.  Denies palpitations. Saw cardiologist Dr. Mancini on March 12, 2021 for IRS, hypertension, hyperlipidemia, PVD, SHAWNA with 6-month follow up advised.  Reports occasionally performs home blood pressure checks with levels ranging 110/80 since gastric sleeve and requests to try off of Benicar-HCT to see if manages blood pressure without it.  ABDOMEN: Denies diarrhea, constipation, nausea, vomiting, abdominal pain, or blood in stool.                  URINARY: Denies flank pain, dysuria or hematuria. Saw urologist Dr. Jeff Estrada on May 17, 2021 for kidney stone.  GENITOURINARY: Denies flank pain, dysuria, frequency or hematuria. No change in menses - postmenopausal. Does not perform monthly breast self examination.  ENDOCRINE: Currently taking cholesterol-lowering medication Zocor and vitamin D 50K units 2 times per week but has not taken Metformin for pre-diabetes in some time. Requests to try off Zocor to see if manages cholesterol without it.  HEME/LYMPH: Denies bleeding problems.  "Reports not longer receives B12 shots every other week from [facility] and instead takes B12 oral.   PERIPHERAL VASCULAR:Denies claudication or cyanosis.  MUSCULOSKELETAL: Denies joint stiffness, pain or swelling. Denies edema. Reports occasional knee(s) and back pain due to arthritis and takes Mobic prn and uses Voltaren gel prn with help.    NEUROLOGIC: Denies history of seizures, tremors, paralysis, alteration of gait or coordination.  PSYCHIATRIC: Denies mood swings, depression, anxiety, homicidal or suicidal thoughts. Reports improved sleeping issues.     PE:   /86   Pulse 110   Temp 97.7 °F (36.5 °C)   Ht 5' 6" (1.676 m)   Wt 123.6 kg (272 lb 7.8 oz)   LMP  (LMP Unknown)   SpO2 96%   BMI 43.98 kg/m²   APPEARANCE: Well nourished, well developed female, obese and pleasant, alert and oriented in no acute distress.   HEAD: Nontender. Full range of motion.  EYES: PERRL, conjunctiva pink, lids no edema.  EARS: External canal patent, no swelling or redness. TM's shiny and clear.  NOSE: Mucosa and turbinates pink, not swollen. No discharge. Nontender sinuses.  THROAT: No pharyngeal erythema or exudate. No stridor.   NECK: Supple, no mass, non tender, no thyroid enlargement.  NODES: No cervical lymph node enlargement.  CHEST: Normal respiratory effort. Lungs clear to auscultation.  CARDIOVASCULAR: Normal S1, S2. No rubs, murmurs or gallops. PMI not displaced. No carotid bruit. Pedal pulses palpable bilaterally. No edema.  ABDOMEN: Bowel sounds present. Not distended. Soft. No tenderness, masses or organomegaly.  BREAST EXAM: Not examined as patient declines and deferred to GYN.  PELVIC EXAM:  Not examined as patient declines and deferred to GYN.   RECTAL EXAM: Not examined as patient declines.  MUSCULOSKELETAL: No joint deformities or stiffness. She is ambulatory without problems. Non tender knee(s) and back with full range of motion noted.  SKIN: No rashes or suspicious lesions, normal color and " turgor.  NEUROLOGIC: Cranial Nerves: II-XII grossly intact. DTR's: Knees, Ankles 2+ and equal bilaterally. Gait & Posture: Normal gait and fine motion.  PSYCHIATRIC: Patient alert, oriented x 3. Mood/Affect normal without acute anxiety or depression noted. Judgment/insight good as she makes appropriate decisions on today's examination.      ASSESSMENT:    ICD-10-CM ICD-9-CM    1. Annual physical exam  Z00.00 V70.0 CBC Auto Differential      TSH      Urinalysis      Comprehensive Metabolic Panel      Lipid Panel      Hemoglobin A1C      Insulin, Random   2. Hypertension, unspecified type  I10 401.9    3. Mixed hyperlipidemia  E78.2 272.2    4. PVD (peripheral vascular disease)  I73.9 443.9    5. Insulin resistance  E88.81 277.7    6. Prediabetes  R73.03 790.29    7. Vitamin D deficiency disease  E55.9 268.9    8. SHAWNA (obstructive sleep apnea)  G47.33 327.23    9. Arthritis  M19.90 716.90    10. Morbid obesity with BMI of 40.0-44.9, adult  E66.01 278.01     Z68.41 V85.41    11. Postmenopausal  Z78.0 V49.81    12. Need for shingles vaccine  Z23 V04.89 Zoster Recombinant Vaccine   13. Need for Td vaccine  Z23 V06.5 (In Office Administered) Td Vaccine - Preservative Free   14. Need for influenza vaccination  Z23 V04.81 Influenza - Quadrivalent *Preferred* (6 months+) (PF)     PLAN:  1. Age-appropriate counseling-appropriate low-sodium, low-cholesterol, low carbohydrate diet and exercise daily, monthly breast self exam, annual wellness examination.   2. Patient advised to call for results.  3. Continue current medications.  4. Okay to to stop taking Benicar-HCT and Zocor for now. But, check home blood pressure readings 2 to 3 times per week (goal BP = less than 140/90).  5. Keep follow up with specialists (including see GYN this year for pap smear).  6. Follow up in about 3 months (around 4/18/2022) for hypertension and hyperlipidemia follow up with Shingrix #2.      Answers for HPI/ROS submitted by the patient on  1/17/2022  activity change: No  unexpected weight change: No  neck pain: No  hearing loss: No  rhinorrhea: No  trouble swallowing: No  eye discharge: No  visual disturbance: No  chest tightness: No  wheezing: No  chest pain: No  palpitations: No  blood in stool: No  constipation: No  vomiting: No  diarrhea: No  polydipsia: No  polyuria: No  difficulty urinating: No  hematuria: No  menstrual problem: No  dysuria: No  joint swelling: No  arthralgias: No  headaches: No  weakness: No  confusion: No  dysphoric mood: No

## 2022-01-18 NOTE — PATIENT INSTRUCTIONS
Please call Dr. Bradshaw for your results.    Please stop taking Benicar-HCT and Zocor for now.    Please check home blood pressure readings 2 to 3 times per week (goal BP = less than 140/90).    Thanks.

## 2022-01-19 LAB
BACTERIA #/AREA URNS AUTO: ABNORMAL /HPF
BASOPHILS # BLD AUTO: 0.04 K/UL (ref 0–0.2)
BASOPHILS NFR BLD: 0.8 % (ref 0–1.9)
BILIRUB UR QL STRIP: NEGATIVE
CLARITY UR REFRACT.AUTO: ABNORMAL
COLOR UR AUTO: YELLOW
DIFFERENTIAL METHOD: ABNORMAL
EOSINOPHIL # BLD AUTO: 0.1 K/UL (ref 0–0.5)
EOSINOPHIL NFR BLD: 2 % (ref 0–8)
ERYTHROCYTE [DISTWIDTH] IN BLOOD BY AUTOMATED COUNT: 14.3 % (ref 11.5–14.5)
GLUCOSE UR QL STRIP: NEGATIVE
HCT VFR BLD AUTO: 46.3 % (ref 37–48.5)
HGB BLD-MCNC: 14 G/DL (ref 12–16)
HGB UR QL STRIP: NEGATIVE
IMM GRANULOCYTES # BLD AUTO: 0 K/UL (ref 0–0.04)
IMM GRANULOCYTES NFR BLD AUTO: 0 % (ref 0–0.5)
KETONES UR QL STRIP: NEGATIVE
LEUKOCYTE ESTERASE UR QL STRIP: NEGATIVE
LYMPHOCYTES # BLD AUTO: 2.3 K/UL (ref 1–4.8)
LYMPHOCYTES NFR BLD: 45.8 % (ref 18–48)
MCH RBC QN AUTO: 26.1 PG (ref 27–31)
MCHC RBC AUTO-ENTMCNC: 30.2 G/DL (ref 32–36)
MCV RBC AUTO: 86 FL (ref 82–98)
MICROSCOPIC COMMENT: ABNORMAL
MONOCYTES # BLD AUTO: 0.3 K/UL (ref 0.3–1)
MONOCYTES NFR BLD: 5.3 % (ref 4–15)
NEUTROPHILS # BLD AUTO: 2.3 K/UL (ref 1.8–7.7)
NEUTROPHILS NFR BLD: 46.1 % (ref 38–73)
NITRITE UR QL STRIP: NEGATIVE
NRBC BLD-RTO: 0 /100 WBC
PH UR STRIP: 5 [PH] (ref 5–8)
PLATELET # BLD AUTO: 236 K/UL (ref 150–450)
PMV BLD AUTO: 12.5 FL (ref 9.2–12.9)
PROT UR QL STRIP: NEGATIVE
RBC # BLD AUTO: 5.37 M/UL (ref 4–5.4)
SP GR UR STRIP: 1.03 (ref 1–1.03)
URN SPEC COLLECT METH UR: ABNORMAL
WBC # BLD AUTO: 5.07 K/UL (ref 3.9–12.7)

## 2022-01-24 ENCOUNTER — PATIENT MESSAGE (OUTPATIENT)
Dept: FAMILY MEDICINE | Facility: CLINIC | Age: 52
End: 2022-01-24
Payer: COMMERCIAL

## 2022-01-24 ENCOUNTER — TELEPHONE (OUTPATIENT)
Dept: FAMILY MEDICINE | Facility: CLINIC | Age: 52
End: 2022-01-24
Payer: COMMERCIAL

## 2022-01-24 NOTE — TELEPHONE ENCOUNTER
Pt portal message. Response from pt after being told to continue current meds.    Ok thanks for the explanation, she told me to stop taking the Blood Pressure and Cholesterol  I just need to clear that she now wants me to start back taking those 2 in particular, because I already stop taking them.  I just need to be sure.  Also can you tell me the name on the results that is low    Please advise

## 2022-01-25 ENCOUNTER — PATIENT MESSAGE (OUTPATIENT)
Dept: FAMILY MEDICINE | Facility: CLINIC | Age: 52
End: 2022-01-25
Payer: COMMERCIAL

## 2022-01-25 ENCOUNTER — HOSPITAL ENCOUNTER (OUTPATIENT)
Dept: RADIOLOGY | Facility: HOSPITAL | Age: 52
Discharge: HOME | End: 2022-01-25
Attending: FAMILY MEDICINE
Payer: COMMERCIAL

## 2022-01-25 DIAGNOSIS — Z12.31 ENCOUNTER FOR SCREENING MAMMOGRAM FOR BREAST CANCER: ICD-10-CM

## 2022-01-25 PROCEDURE — 77063 BREAST TOMOSYNTHESIS BI: CPT | Mod: 26,,, | Performed by: RADIOLOGY

## 2022-01-25 PROCEDURE — 77067 SCR MAMMO BI INCL CAD: CPT | Mod: 26,,, | Performed by: RADIOLOGY

## 2022-01-25 PROCEDURE — 77067 MAMMO DIGITAL SCREENING BILAT WITH TOMO: ICD-10-PCS | Mod: 26,,, | Performed by: RADIOLOGY

## 2022-01-25 PROCEDURE — 77063 BREAST TOMOSYNTHESIS BI: CPT | Mod: TC

## 2022-01-25 PROCEDURE — 77067 SCR MAMMO BI INCL CAD: CPT | Mod: TC

## 2022-01-25 PROCEDURE — 77063 MAMMO DIGITAL SCREENING BILAT WITH TOMO: ICD-10-PCS | Mod: 26,,, | Performed by: RADIOLOGY

## 2022-01-25 NOTE — TELEPHONE ENCOUNTER
Please call patient and ddvise pt lab results are within acceptable range except borderline decreased kidney function.  Please avoid taking NSAID's (Advil, ibuprofen, Motrin, Aleve) as these may cause further declining kidney function.  Let's plan to recheck this level at her follow-up visit with me.  Otherwise, please continue current medications as we discussed during visit (SO STOP Benicar-HCT and Zocor). Thanks.

## 2022-01-26 ENCOUNTER — HOSPITAL ENCOUNTER (OUTPATIENT)
Dept: RADIOLOGY | Facility: HOSPITAL | Age: 52
Discharge: HOME | End: 2022-01-26
Attending: FAMILY MEDICINE
Payer: COMMERCIAL

## 2022-01-26 DIAGNOSIS — R92.8 ABNORMAL MAMMOGRAM: ICD-10-CM

## 2022-01-26 PROCEDURE — 76642 US BREAST LEFT LIMITED: ICD-10-PCS | Mod: 26,LT,, | Performed by: RADIOLOGY

## 2022-01-26 PROCEDURE — 76642 ULTRASOUND BREAST LIMITED: CPT | Mod: TC,LT

## 2022-01-26 PROCEDURE — 77065 MAMMO DIGITAL DIAGNOSTIC LEFT WITH TOMO: ICD-10-PCS | Mod: 26,LT,, | Performed by: RADIOLOGY

## 2022-01-26 PROCEDURE — 77065 DX MAMMO INCL CAD UNI: CPT | Mod: TC,LT

## 2022-01-26 PROCEDURE — 76642 ULTRASOUND BREAST LIMITED: CPT | Mod: 26,LT,, | Performed by: RADIOLOGY

## 2022-01-26 PROCEDURE — 77061 MAMMO DIGITAL DIAGNOSTIC LEFT WITH TOMO: ICD-10-PCS | Mod: 26,LT,, | Performed by: RADIOLOGY

## 2022-01-26 PROCEDURE — 77065 DX MAMMO INCL CAD UNI: CPT | Mod: 26,LT,, | Performed by: RADIOLOGY

## 2022-01-26 PROCEDURE — 77061 BREAST TOMOSYNTHESIS UNI: CPT | Mod: 26,LT,, | Performed by: RADIOLOGY

## 2022-02-01 ENCOUNTER — OFFICE VISIT (OUTPATIENT)
Dept: SURGERY | Facility: CLINIC | Age: 52
End: 2022-02-01
Payer: COMMERCIAL

## 2022-02-01 VITALS
HEART RATE: 80 BPM | TEMPERATURE: 99 F | BODY MASS INDEX: 45.12 KG/M2 | WEIGHT: 279.56 LBS | DIASTOLIC BLOOD PRESSURE: 92 MMHG | SYSTOLIC BLOOD PRESSURE: 131 MMHG

## 2022-02-01 DIAGNOSIS — R92.8 ABNORMAL MAMMOGRAM OF LEFT BREAST: Primary | ICD-10-CM

## 2022-02-01 PROCEDURE — 99203 PR OFFICE/OUTPT VISIT, NEW, LEVL III, 30-44 MIN: ICD-10-PCS | Mod: S$GLB,,, | Performed by: SURGERY

## 2022-02-01 PROCEDURE — 3080F DIAST BP >= 90 MM HG: CPT | Mod: CPTII,S$GLB,, | Performed by: SURGERY

## 2022-02-01 PROCEDURE — 3044F HG A1C LEVEL LT 7.0%: CPT | Mod: CPTII,S$GLB,, | Performed by: SURGERY

## 2022-02-01 PROCEDURE — 3008F BODY MASS INDEX DOCD: CPT | Mod: CPTII,S$GLB,, | Performed by: SURGERY

## 2022-02-01 PROCEDURE — 99999 PR PBB SHADOW E&M-EST. PATIENT-LVL III: CPT | Mod: PBBFAC,,, | Performed by: SURGERY

## 2022-02-01 PROCEDURE — 1159F PR MEDICATION LIST DOCUMENTED IN MEDICAL RECORD: ICD-10-PCS | Mod: CPTII,S$GLB,, | Performed by: SURGERY

## 2022-02-01 PROCEDURE — 3044F PR MOST RECENT HEMOGLOBIN A1C LEVEL <7.0%: ICD-10-PCS | Mod: CPTII,S$GLB,, | Performed by: SURGERY

## 2022-02-01 PROCEDURE — 3008F PR BODY MASS INDEX (BMI) DOCUMENTED: ICD-10-PCS | Mod: CPTII,S$GLB,, | Performed by: SURGERY

## 2022-02-01 PROCEDURE — 99203 OFFICE O/P NEW LOW 30 MIN: CPT | Mod: S$GLB,,, | Performed by: SURGERY

## 2022-02-01 PROCEDURE — 1159F MED LIST DOCD IN RCRD: CPT | Mod: CPTII,S$GLB,, | Performed by: SURGERY

## 2022-02-01 PROCEDURE — 3075F SYST BP GE 130 - 139MM HG: CPT | Mod: CPTII,S$GLB,, | Performed by: SURGERY

## 2022-02-01 PROCEDURE — 3075F PR MOST RECENT SYSTOLIC BLOOD PRESS GE 130-139MM HG: ICD-10-PCS | Mod: CPTII,S$GLB,, | Performed by: SURGERY

## 2022-02-01 PROCEDURE — 99999 PR PBB SHADOW E&M-EST. PATIENT-LVL III: ICD-10-PCS | Mod: PBBFAC,,, | Performed by: SURGERY

## 2022-02-01 PROCEDURE — 3080F PR MOST RECENT DIASTOLIC BLOOD PRESSURE >= 90 MM HG: ICD-10-PCS | Mod: CPTII,S$GLB,, | Performed by: SURGERY

## 2022-02-01 NOTE — PROGRESS NOTES
[facility] -   General Surgery History & Physical    SUBJECTIVE:     History of Present Illness:  Patient is a 51 y.o. female presents with abnormal findings on routine screening mammogram.     Family history includes maternal cousin who had breast CA. Mother had history of uterine cancer. No family history of ovarian or colorectal cancer.    Chief Complaint   Patient presents with    Other     Cat 4 mammo, cyst, lipoma, lump/bump L breast       Review of patient's allergies indicates:  No Known Allergies    Current Outpatient Medications   Medication Sig Dispense Refill    cyclobenzaprine (FLEXERIL) 10 MG tablet TAKE 1 TABLET(10 MG) BY MOUTH EVERY EVENING AS NEEDED FOR MUSCLE SPASMS 30 tablet 1    diclofenac sodium (VOLTAREN) 1 % Gel Apply 2 g topically 4 (four) times daily as needed. 100 g 1    ergocalciferol (ERGOCALCIFEROL) 50,000 unit Cap TAKE 1 CAPSULE BY MOUTH TWICE A WEEK ON MONDAYS AND FRIDAYS 25 capsule 0    meloxicam (MOBIC) 15 MG tablet TAKE 1 TABLET(15 MG) BY MOUTH EVERY DAY 30 tablet 1    multivitamin capsule Take 1 capsule by mouth once daily.       No current facility-administered medications for this visit.       Past Medical History:   Diagnosis Date    CEDRICK positive 08/2017    High cholesterol     History of vitamin D deficiency     Hypertension     Hypothyroidism     Insulin resistance 9/12/2013    Kidney stones     Morbid obesity with BMI of 60.0-69.9, adult     Postmenopausal     Pre-diabetes     Snoring      Past Surgical History:   Procedure Laterality Date    CYSTOSCOPY W/ LASER LITHOTRIPSY      x 2    DILATION AND CURETTAGE OF UTERUS      x1 for abnormal bleeding    gastric sleeve  04/22/2021    HYSTERECTOMY      UTERINE FIBROID SURGERY  2019     Family History   Problem Relation Age of Onset    Hypertension Mother     Heart disease Father 69        MI    Hypertension Father     Diabetes Father     Cancer Father         prostate cancer     Hypertension Sister     Cancer Paternal Aunt         Brain cancer    Cancer Paternal Uncle         Pancreas cancer    Hypertension Maternal Grandmother     Hypertension Maternal Grandfather     Stroke Maternal Grandfather 86    Hypertension Paternal Grandmother     Hypertension Paternal Grandfather     No Known Problems Daughter     No Known Problems Daughter     Cancer Cousin         Breast cancer    Breast cancer Paternal Cousin      Social History     Tobacco Use    Smoking status: Never Smoker    Smokeless tobacco: Never Used   Substance Use Topics    Alcohol use: Yes     Alcohol/week: 0.0 standard drinks     Comment: occasionally    Drug use: No        Review of Systems:  Review of Systems   Constitutional: Negative for fever and unexpected weight change.   HENT: Negative for trouble swallowing.    Respiratory: Negative for cough and shortness of breath.    Cardiovascular: Negative for chest pain.   Gastrointestinal: Negative for abdominal pain, blood in stool, constipation, diarrhea, nausea and vomiting.   Genitourinary: Negative for difficulty urinating, dysuria and hematuria.       OBJECTIVE:     Vital Signs (Most Recent)  Temp: 98.6 °F (37 °C) (02/01/22 1255)  Pulse: 80 (02/01/22 1255)  BP: (!) 131/92 (02/01/22 1255)     126.8 kg (279 lb 8.7 oz)     Physical Exam:  Physical Exam  Vitals and nursing note reviewed.   Constitutional:       General: She is not in acute distress.     Appearance: Normal appearance. She is not ill-appearing, toxic-appearing or diaphoretic.   HENT:      Head: Normocephalic and atraumatic.      Right Ear: External ear normal.      Left Ear: External ear normal.      Nose: Nose normal. No congestion or rhinorrhea.      Mouth/Throat:      Mouth: Mucous membranes are moist.      Pharynx: Oropharynx is clear.   Eyes:      General: No scleral icterus.        Right eye: No discharge.         Left eye: No discharge.      Extraocular Movements: Extraocular movements intact.       Conjunctiva/sclera: Conjunctivae normal.      Pupils: Pupils are equal, round, and reactive to light.   Cardiovascular:      Rate and Rhythm: Normal rate and regular rhythm.   Pulmonary:      Effort: Pulmonary effort is normal. No respiratory distress.      Breath sounds: No stridor.   Chest:      Comments: No palpable masses or lymphadenopathy  Abdominal:      General: There is no distension.      Palpations: Abdomen is soft.      Tenderness: There is no abdominal tenderness. There is no guarding or rebound.   Musculoskeletal:         General: No deformity. Normal range of motion.      Cervical back: Normal range of motion and neck supple. No rigidity.   Skin:     General: Skin is warm and dry.      Capillary Refill: Capillary refill takes less than 2 seconds.      Coloration: Skin is not jaundiced.      Findings: No rash.   Neurological:      General: No focal deficit present.      Mental Status: She is alert and oriented to person, place, and time. Mental status is at baseline.      Cranial Nerves: No cranial nerve deficit.      Coordination: Coordination normal.      Gait: Gait normal.   Psychiatric:         Mood and Affect: Mood normal.         Behavior: Behavior normal.         Thought Content: Thought content normal.         Judgment: Judgment normal.         Laboratory      Diagnostic Results:  Result:   Mammo Digital Diagnostic Left with Roderick  US Breast Left Limited     History:  Patient is 51 y.o. and is seen for diagnostic imaging.     Films Compared:  Prior images (if available) were compared.     Findings:  This procedure was performed using tomosynthesis. Computer-aided detection was utilized in the interpretation of this examination.  Mammo Digital Diagnostic Left with Roderick  The left breast has scattered areas of fibroglandular density.     There is a 7 mm oval mass seen in the left breast in the anterior depth.      US Breast Left Limited  There is a 7.5 mm oval, hypoechoic mass with no  posterior features seen in the left breast at 12 o'clock, 4 cm from the nipple. No suspicious axillary lymph nodes.      Impression:  Left  Mass: Left breast 7 mm mass at the anterior position. Assessment: 4A - Suspicious finding. Ultrasound-guided biopsy is recommended.      BI-RADS Category:   Overall: 4A - Low Suspicion for Malignancy     Recommendation:  Ultrasound-guided biopsy is recommended.    ASSESSMENT/PLAN:     Aliza was seen today for other.    Diagnoses and all orders for this visit:    Abnormal mammogram of left breast  -     US Breast Biopsy with Imaging 1st site Right; Future  -     Mammo Digital Diagnostic Post Procedure_Mammo Guide_Clip_Needle Loc only; Future         Will order needle biopsy for tissue diagnosis. Will await results.    Ofelia Vizcarra, DO  General Surgery  [facility]  2/1/2022    I spent a total of 30 minutes on the day of the visit.This includes face to face time and non-face to face time preparing to see the patient (eg, review of tests), obtaining and/or reviewing separately obtained history, documenting clinical information in the electronic or other health record, independently interpreting results and communicating results to the patient/family/caregiver, or care coordinator.

## 2022-02-14 ENCOUNTER — HOSPITAL ENCOUNTER (OUTPATIENT)
Dept: RADIOLOGY | Facility: HOSPITAL | Age: 52
Discharge: HOME | End: 2022-02-14
Attending: SURGERY
Payer: COMMERCIAL

## 2022-02-14 DIAGNOSIS — R92.8 ABNORMAL MAMMOGRAM OF LEFT BREAST: ICD-10-CM

## 2022-02-14 PROCEDURE — 88305 TISSUE EXAM BY PATHOLOGIST: CPT | Mod: 26,,, | Performed by: PATHOLOGY

## 2022-02-14 PROCEDURE — 88305 TISSUE EXAM BY PATHOLOGIST: CPT | Performed by: PATHOLOGY

## 2022-02-14 PROCEDURE — A4648 IMPLANTABLE TISSUE MARKER: HCPCS

## 2022-02-14 PROCEDURE — 19083 US BREAST BIOPSY WITH IMAGING 1ST SITE LEFT: ICD-10-PCS | Mod: LT,,, | Performed by: RADIOLOGY

## 2022-02-14 PROCEDURE — 19083 BX BREAST 1ST LESION US IMAG: CPT | Mod: LT,,, | Performed by: RADIOLOGY

## 2022-02-14 PROCEDURE — 88305 TISSUE EXAM BY PATHOLOGIST: ICD-10-PCS | Mod: 26,,, | Performed by: PATHOLOGY

## 2022-02-17 ENCOUNTER — TELEPHONE (OUTPATIENT)
Dept: SURGERY | Facility: HOSPITAL | Age: 52
End: 2022-02-17
Payer: COMMERCIAL

## 2022-02-17 LAB
FINAL PATHOLOGIC DIAGNOSIS: NORMAL
GROSS: NORMAL
Lab: NORMAL

## 2022-02-17 NOTE — TELEPHONE ENCOUNTER
Spoke with patient over telephone and discussed pathology results. Pathology negative for malignancy or concerning features. Recommend close follow up with PCP and gynecologist.    Ofelia Vizcarra,   General Surgery  [facility] [place]  2/17/2022

## 2022-02-18 ENCOUNTER — TELEPHONE (OUTPATIENT)
Dept: SURGERY | Facility: CLINIC | Age: 52
End: 2022-02-18
Payer: COMMERCIAL

## 2022-02-18 NOTE — TELEPHONE ENCOUNTER
Spoke with patient today. Informed patient of biopsy results. Patient verbalized understanding.      ----- Message from Ofelia Vizcarra DO sent at 2/17/2022  4:40 PM CST -----  Contact: self/[phone]  Yes I wanted to tell her the good news that her biopsy came back negative for cancer :)    ----- Message -----  From: Ele Alicea MA  Sent: 2/17/2022   4:11 PM CST  To: Ofelia Vizcarra, DO    Did you try to call this patient?  ----- Message -----  From: Delia Kurtz  Sent: 2/17/2022  12:06 PM CST  To: [facility] Staff    Type:  Patient Returning Call    Who Called:Aliza Sagastume  Who Left Message for Patient:nurse  Does the patient know what this is regarding?:results  Would the patient rather a call back or a response via [facility]? Call back   Best Call Back Number:[phone]  Additional Information: Patient is at work and would like to know who call and when she can call back with a good number. She would like someone to call her back around 4 today. Thanks/ar

## 2022-04-19 ENCOUNTER — OFFICE VISIT (OUTPATIENT)
Dept: FAMILY MEDICINE | Facility: CLINIC | Age: 52
End: 2022-04-19
Payer: COMMERCIAL

## 2022-04-19 VITALS
TEMPERATURE: 97 F | BODY MASS INDEX: 43.15 KG/M2 | RESPIRATION RATE: 18 BRPM | DIASTOLIC BLOOD PRESSURE: 74 MMHG | OXYGEN SATURATION: 99 % | SYSTOLIC BLOOD PRESSURE: 118 MMHG | HEART RATE: 107 BPM | WEIGHT: 268.5 LBS | HEIGHT: 66 IN

## 2022-04-19 DIAGNOSIS — E55.9 VITAMIN D DEFICIENCY DISEASE: ICD-10-CM

## 2022-04-19 DIAGNOSIS — E78.2 MIXED HYPERLIPIDEMIA: ICD-10-CM

## 2022-04-19 DIAGNOSIS — Z23 NEED FOR SHINGLES VACCINE: ICD-10-CM

## 2022-04-19 DIAGNOSIS — I10 ESSENTIAL HYPERTENSION: ICD-10-CM

## 2022-04-19 DIAGNOSIS — N28.9 FUNCTION KIDNEY DECREASED: ICD-10-CM

## 2022-04-19 DIAGNOSIS — E66.01 MORBID OBESITY WITH BMI OF 40.0-44.9, ADULT: ICD-10-CM

## 2022-04-19 PROCEDURE — 1160F RVW MEDS BY RX/DR IN RCRD: CPT | Mod: CPTII,S$GLB,, | Performed by: FAMILY MEDICINE

## 2022-04-19 PROCEDURE — 3044F PR MOST RECENT HEMOGLOBIN A1C LEVEL <7.0%: ICD-10-PCS | Mod: CPTII,S$GLB,, | Performed by: FAMILY MEDICINE

## 2022-04-19 PROCEDURE — 99999 PR PBB SHADOW E&M-EST. PATIENT-LVL IV: CPT | Mod: PBBFAC,,, | Performed by: FAMILY MEDICINE

## 2022-04-19 PROCEDURE — 3074F SYST BP LT 130 MM HG: CPT | Mod: CPTII,S$GLB,, | Performed by: FAMILY MEDICINE

## 2022-04-19 PROCEDURE — 90750 HZV VACC RECOMBINANT IM: CPT | Mod: S$GLB,,, | Performed by: FAMILY MEDICINE

## 2022-04-19 PROCEDURE — 99214 PR OFFICE/OUTPT VISIT, EST, LEVL IV, 30-39 MIN: ICD-10-PCS | Mod: 25,S$GLB,, | Performed by: FAMILY MEDICINE

## 2022-04-19 PROCEDURE — 3008F BODY MASS INDEX DOCD: CPT | Mod: CPTII,S$GLB,, | Performed by: FAMILY MEDICINE

## 2022-04-19 PROCEDURE — 1160F PR REVIEW ALL MEDS BY PRESCRIBER/CLIN PHARMACIST DOCUMENTED: ICD-10-PCS | Mod: CPTII,S$GLB,, | Performed by: FAMILY MEDICINE

## 2022-04-19 PROCEDURE — 3078F PR MOST RECENT DIASTOLIC BLOOD PRESSURE < 80 MM HG: ICD-10-PCS | Mod: CPTII,S$GLB,, | Performed by: FAMILY MEDICINE

## 2022-04-19 PROCEDURE — 99999 PR PBB SHADOW E&M-EST. PATIENT-LVL IV: ICD-10-PCS | Mod: PBBFAC,,, | Performed by: FAMILY MEDICINE

## 2022-04-19 PROCEDURE — 3074F PR MOST RECENT SYSTOLIC BLOOD PRESSURE < 130 MM HG: ICD-10-PCS | Mod: CPTII,S$GLB,, | Performed by: FAMILY MEDICINE

## 2022-04-19 PROCEDURE — 3078F DIAST BP <80 MM HG: CPT | Mod: CPTII,S$GLB,, | Performed by: FAMILY MEDICINE

## 2022-04-19 PROCEDURE — 90750 ZOSTER RECOMBINANT VACCINE: ICD-10-PCS | Mod: S$GLB,,, | Performed by: FAMILY MEDICINE

## 2022-04-19 PROCEDURE — 3044F HG A1C LEVEL LT 7.0%: CPT | Mod: CPTII,S$GLB,, | Performed by: FAMILY MEDICINE

## 2022-04-19 PROCEDURE — 3008F PR BODY MASS INDEX (BMI) DOCUMENTED: ICD-10-PCS | Mod: CPTII,S$GLB,, | Performed by: FAMILY MEDICINE

## 2022-04-19 PROCEDURE — 1159F MED LIST DOCD IN RCRD: CPT | Mod: CPTII,S$GLB,, | Performed by: FAMILY MEDICINE

## 2022-04-19 PROCEDURE — 90471 IMMUNIZATION ADMIN: CPT | Mod: S$GLB,,, | Performed by: FAMILY MEDICINE

## 2022-04-19 PROCEDURE — 99214 OFFICE O/P EST MOD 30 MIN: CPT | Mod: 25,S$GLB,, | Performed by: FAMILY MEDICINE

## 2022-04-19 PROCEDURE — 90471 ZOSTER RECOMBINANT VACCINE: ICD-10-PCS | Mod: S$GLB,,, | Performed by: FAMILY MEDICINE

## 2022-04-19 PROCEDURE — 1159F PR MEDICATION LIST DOCUMENTED IN MEDICAL RECORD: ICD-10-PCS | Mod: CPTII,S$GLB,, | Performed by: FAMILY MEDICINE

## 2022-04-19 RX ORDER — OLMESARTAN MEDOXOMIL 40 MG/1
TABLET ORAL
COMMUNITY
End: 2022-04-19

## 2022-04-19 RX ORDER — ERGOCALCIFEROL 1.25 MG/1
CAPSULE ORAL
Qty: 25 CAPSULE | Refills: 1 | Status: SHIPPED | OUTPATIENT
Start: 2022-04-19

## 2022-04-19 RX ORDER — OLMESARTAN MEDOXOMIL AND HYDROCHLOROTHIAZIDE 40/25 40; 25 MG/1; MG/1
1 TABLET ORAL DAILY
COMMUNITY
End: 2022-04-19 | Stop reason: SDUPTHER

## 2022-04-19 RX ORDER — OLMESARTAN MEDOXOMIL AND HYDROCHLOROTHIAZIDE 40/25 40; 25 MG/1; MG/1
1 TABLET ORAL DAILY
Qty: 90 TABLET | Refills: 1 | Status: SHIPPED | OUTPATIENT
Start: 2022-04-19

## 2022-04-19 NOTE — PROGRESS NOTES
Aliza Sagastume    Chief Complaint   Patient presents with    Follow-up    Hypertension    Hyperlipidemia       History of Present Illness:   Aliza Sagastume is a 51 y.o. female who presents today for hypertension and hyperlipidemia follow-up. She is not fasting with taking blood pressure medication today. She states she exercises by walking  once a week for 15-20 minutes each time and monitors her diet.     On January 18, 2022 I advised okay for her to stop taking Benicar-HCT and Zocor for now as she requested; however, she states she restarted taking Benicar-HCT as her blood pressure levels were high. She states she performs home blood pressure checks every 2-3 days with levels ranging 118/74 and with systolic levels at 150 when she was not on blood pressure medication.    She states she received left breast biopsy on February 14, 2022 for benign tumor per Ofelia Viczarra DO.    Otherwise, she denies having fever, chills, fatigue, appetite changes; shortness of breath, cough, wheezing; chest pain, palpitations,  leg swelling; other sinus symptom; abdominal pain, nausea, vomiting, diarrhea, constipation; unusual urinary symptoms; polydipsia, polyphagia, polyuria, hot or cold intolerance; back pain; headache, numbness, acute visual changes; anxiety, depression, homicidal or suicidal thoughts.     Labs:                      WBC                      5.07                01/18/2022                 HGB                      14.0                01/18/2022                 HCT                      46.3                01/18/2022                 PLT                      236                 01/18/2022                 CHOL                     215 (H)             01/18/2022                 TRIG                     74                  01/18/2022                 HDL                      72                  01/18/2022                 ALT                      11                  01/18/2022                 AST                       15                  01/18/2022                 NA                       138                 01/18/2022                 K                        3.8                 01/18/2022                 CL                       101                 01/18/2022                 CREATININE               1.3                 01/18/2022                 BUN                      34 (H)              01/18/2022                 CO2                      25                  01/18/2022                 TSH                      3.691               01/18/2022                 INR                      1.0                 07/30/2013                 HGBA1C                   5.5                 01/18/2022               LDLCALC                  128.2               01/18/2022         ESTGFRAFRICA             54.1 (A)            01/18/2022                 EGFRNONAA                47.0 (A)            01/18/2022                      Current Outpatient Medications   Medication Sig    cyclobenzaprine (FLEXERIL) 10 MG tablet TAKE 1 TABLET(10 MG) BY MOUTH EVERY EVENING AS NEEDED FOR MUSCLE SPASMS    diclofenac sodium (VOLTAREN) 1 % Gel Apply 2 g topically 4 (four) times daily as needed.    ergocalciferol (ERGOCALCIFEROL) 50,000 unit Cap TAKE 1 CAPSULE BY MOUTH TWICE A WEEK ON MONDAYS AND FRIDAYS    meloxicam (MOBIC) 15 MG tablet TAKE 1 TABLET(15 MG) BY MOUTH EVERY DAY    multivitamin capsule Take 1 capsule by mouth once daily.    olmesartan-hydrochlorothiazide (BENICAR HCT) 40-25 mg per tablet Take 1 tablet by mouth once daily.       Review of Systems   Constitutional: Negative for activity change, appetite change, chills, fatigue and fever.        Weight 123.6 kg (272 lb 7.8 oz) at January 18, 2022 visit.   Eyes: Negative for visual disturbance.   Respiratory: Negative for cough, shortness of breath and wheezing.    Cardiovascular: Negative for chest pain, palpitations and leg swelling.        See history of present illness.    Gastrointestinal: Negative for abdominal pain, blood in stool, constipation, diarrhea, nausea and vomiting.   Endocrine: Negative for cold intolerance, heat intolerance, polydipsia, polyphagia and polyuria.        See history of present illness.   Genitourinary: Negative for difficulty urinating.        See history of present illness.   Musculoskeletal: Negative for arthralgias and back pain.   Neurological: Negative for numbness and headaches.   Psychiatric/Behavioral: Negative for dysphoric mood and suicidal ideas. The patient is not nervous/anxious.         Negative for homicidal ideas.       Objective:  Physical Exam  Vitals reviewed.   Constitutional:       General: She is not in acute distress.     Appearance: Normal appearance. She is obese. She is not ill-appearing, toxic-appearing or diaphoretic.      Comments: Pleasant.   Cardiovascular:      Rate and Rhythm: Normal rate and regular rhythm.      Pulses: Normal pulses.      Heart sounds: No murmur heard.  Pulmonary:      Effort: Pulmonary effort is normal. No respiratory distress.      Breath sounds: Normal breath sounds. No wheezing.   Abdominal:      General: Bowel sounds are normal. There is no distension.      Palpations: Abdomen is soft. There is no mass.      Tenderness: There is no abdominal tenderness. There is no guarding or rebound.   Musculoskeletal:         General: No swelling or tenderness. Normal range of motion.      Cervical back: Normal range of motion and neck supple. No tenderness.      Comments: She is ambulatory without problems.   Lymphadenopathy:      Cervical: No cervical adenopathy.   Skin:     General: Skin is warm.   Neurological:      General: No focal deficit present.      Mental Status: She is alert and oriented to person, place, and time.   Psychiatric:         Mood and Affect: Mood normal.         Behavior: Behavior normal.         Thought Content: Thought content normal.         Judgment: Judgment normal.          ASSESSMENT:  1. Essential hypertension    2. Mixed hyperlipidemia    3. Function kidney decreased    4. Vitamin D deficiency disease    5. Morbid obesity with BMI of 40.0-44.9, adult    6. Need for shingles vaccine        PLAN:  Aliza was seen today for follow-up, hypertension and hyperlipidemia.    Diagnoses and all orders for this visit:    Essential hypertension  -     olmesartan-hydrochlorothiazide (BENICAR HCT) 40-25 mg per tablet; Take 1 tablet by mouth once daily.    Mixed hyperlipidemia  -     Lipid Panel; Future    Function kidney decreased  -     Basic Metabolic Panel; Future    Vitamin D deficiency disease  -     ergocalciferol (ERGOCALCIFEROL) 50,000 unit Cap; TAKE 1 CAPSULE BY MOUTH TWICE A WEEK ON MONDAYS AND FRIDAYS    Morbid obesity with BMI of 40.0-44.9, adult    Need for shingles vaccine  -     Zoster Recombinant Vaccine       Patient advised to call for results.  Continue current medications, follow low sodium, low cholesterol, low carb diet, daily walks.  Prescription refills as noted above.  Keep follow up with specialists.  Flu shot this fall.  Follow up in about 4 months (around 8/19/2022) for hypertension and prediabetes follow up.    Scribe Attestation:   I, Weston Boss, am scribing for, and in the presence of, Dr. Bradshaw. I performed the above scribed service and the documentation accurately describes the services I performed. I attest to the accuracy of the note.    I, Dr. Bradshaw, reviewed documentation as scribed above. I personally performed the services described in this documentation.  I agree that the record reflects my personal performance and is accurate and complete. . 04/19/2022

## 2022-04-22 ENCOUNTER — PATIENT MESSAGE (OUTPATIENT)
Dept: FAMILY MEDICINE | Facility: CLINIC | Age: 52
End: 2022-04-22
Payer: COMMERCIAL

## 2022-04-25 ENCOUNTER — LAB VISIT (OUTPATIENT)
Dept: LAB | Facility: HOSPITAL | Age: 52
End: 2022-04-25
Attending: FAMILY MEDICINE
Payer: COMMERCIAL

## 2022-04-25 DIAGNOSIS — E51.9 THIAMIN DEFICIENCY: ICD-10-CM

## 2022-04-25 DIAGNOSIS — E78.2 MIXED HYPERLIPIDEMIA: ICD-10-CM

## 2022-04-25 DIAGNOSIS — D50.9 IRON DEFICIENCY ANEMIA, UNSPECIFIED: ICD-10-CM

## 2022-04-25 DIAGNOSIS — R79.89 HYPOURICEMIA: ICD-10-CM

## 2022-04-25 DIAGNOSIS — D51.8 OTHER VITAMIN B12 DEFICIENCY ANEMIA: ICD-10-CM

## 2022-04-25 DIAGNOSIS — N28.9 FUNCTION KIDNEY DECREASED: ICD-10-CM

## 2022-04-25 LAB
ANION GAP SERPL CALC-SCNC: 12 MMOL/L (ref 8–16)
BASOPHILS # BLD AUTO: 0.03 K/UL (ref 0–0.2)
BASOPHILS NFR BLD: 0.7 % (ref 0–1.9)
BUN SERPL-MCNC: 33 MG/DL (ref 6–20)
CALCIUM SERPL-MCNC: 10 MG/DL (ref 8.7–10.5)
CHLORIDE SERPL-SCNC: 97 MMOL/L (ref 95–110)
CHOLEST SERPL-MCNC: 269 MG/DL (ref 120–199)
CHOLEST/HDLC SERPL: 3.4 {RATIO} (ref 2–5)
CO2 SERPL-SCNC: 26 MMOL/L (ref 23–29)
CREAT SERPL-MCNC: 1 MG/DL (ref 0.5–1.4)
DIFFERENTIAL METHOD: ABNORMAL
EOSINOPHIL # BLD AUTO: 0.1 K/UL (ref 0–0.5)
EOSINOPHIL NFR BLD: 1.7 % (ref 0–8)
ERYTHROCYTE [DISTWIDTH] IN BLOOD BY AUTOMATED COUNT: 14 % (ref 11.5–14.5)
EST. GFR  (AFRICAN AMERICAN): >60 ML/MIN/1.73 M^2
EST. GFR  (NON AFRICAN AMERICAN): >60 ML/MIN/1.73 M^2
FERRITIN SERPL-MCNC: 235 NG/ML (ref 20–300)
GLUCOSE SERPL-MCNC: 83 MG/DL (ref 70–110)
HCT VFR BLD AUTO: 43.3 % (ref 37–48.5)
HDLC SERPL-MCNC: 78 MG/DL (ref 40–75)
HDLC SERPL: 29 % (ref 20–50)
HGB BLD-MCNC: 13.3 G/DL (ref 12–16)
IMM GRANULOCYTES # BLD AUTO: 0.01 K/UL (ref 0–0.04)
IMM GRANULOCYTES NFR BLD AUTO: 0.2 % (ref 0–0.5)
IRON SERPL-MCNC: 94 UG/DL (ref 30–160)
LDLC SERPL CALC-MCNC: 177.2 MG/DL (ref 63–159)
LYMPHOCYTES # BLD AUTO: 1.7 K/UL (ref 1–4.8)
LYMPHOCYTES NFR BLD: 39.8 % (ref 18–48)
MCH RBC QN AUTO: 25.7 PG (ref 27–31)
MCHC RBC AUTO-ENTMCNC: 30.7 G/DL (ref 32–36)
MCV RBC AUTO: 84 FL (ref 82–98)
MONOCYTES # BLD AUTO: 0.3 K/UL (ref 0.3–1)
MONOCYTES NFR BLD: 7.9 % (ref 4–15)
NEUTROPHILS # BLD AUTO: 2.1 K/UL (ref 1.8–7.7)
NEUTROPHILS NFR BLD: 49.7 % (ref 38–73)
NONHDLC SERPL-MCNC: 191 MG/DL
NRBC BLD-RTO: 0 /100 WBC
PLATELET # BLD AUTO: 202 K/UL (ref 150–450)
PMV BLD AUTO: 12.4 FL (ref 9.2–12.9)
POTASSIUM SERPL-SCNC: 3.6 MMOL/L (ref 3.5–5.1)
RBC # BLD AUTO: 5.17 M/UL (ref 4–5.4)
SATURATED IRON: 25 % (ref 20–50)
SODIUM SERPL-SCNC: 135 MMOL/L (ref 136–145)
TOTAL IRON BINDING CAPACITY: 370 UG/DL (ref 250–450)
TRANSFERRIN SERPL-MCNC: 250 MG/DL (ref 200–375)
TRIGL SERPL-MCNC: 69 MG/DL (ref 30–150)
VIT B12 SERPL-MCNC: >2000 PG/ML (ref 210–950)
WBC # BLD AUTO: 4.2 K/UL (ref 3.9–12.7)

## 2022-04-25 PROCEDURE — 85025 COMPLETE CBC W/AUTO DIFF WBC: CPT | Performed by: SPECIALIST

## 2022-04-25 PROCEDURE — 80048 BASIC METABOLIC PNL TOTAL CA: CPT | Performed by: FAMILY MEDICINE

## 2022-04-25 PROCEDURE — 84466 ASSAY OF TRANSFERRIN: CPT | Performed by: SPECIALIST

## 2022-04-25 PROCEDURE — 82607 VITAMIN B-12: CPT | Performed by: SPECIALIST

## 2022-04-25 PROCEDURE — 82728 ASSAY OF FERRITIN: CPT | Performed by: SPECIALIST

## 2022-04-25 PROCEDURE — 36415 COLL VENOUS BLD VENIPUNCTURE: CPT | Mod: PO | Performed by: FAMILY MEDICINE

## 2022-04-25 PROCEDURE — 80061 LIPID PANEL: CPT | Performed by: FAMILY MEDICINE

## 2022-04-27 DIAGNOSIS — E78.5 HYPERLIPIDEMIA, UNSPECIFIED HYPERLIPIDEMIA TYPE: ICD-10-CM

## 2022-04-27 RX ORDER — SIMVASTATIN 20 MG/1
20 TABLET, FILM COATED ORAL NIGHTLY
Qty: 90 TABLET | Refills: 1 | Status: SHIPPED | OUTPATIENT
Start: 2022-04-27

## 2022-05-02 ENCOUNTER — TELEPHONE (OUTPATIENT)
Dept: FAMILY MEDICINE | Facility: CLINIC | Age: 52
End: 2022-05-02
Payer: COMMERCIAL

## 2022-05-02 ENCOUNTER — PATIENT MESSAGE (OUTPATIENT)
Dept: FAMILY MEDICINE | Facility: CLINIC | Age: 52
End: 2022-05-02
Payer: COMMERCIAL

## 2022-05-02 NOTE — TELEPHONE ENCOUNTER
Advise pt kidney function looks fine. How long for back pain? Did back pain start after starting Zocor?

## 2022-05-02 NOTE — TELEPHONE ENCOUNTER
Per pt portal     Helnito Bradshaw,   Thank you have started the prescriptions for the Zocor. I have a question about my kidneys, I have upper back pain and I was wondering if my those labs were okay? Or is there something else that shows what could be causing my back pain? Is it possible to have back pain from the weight loss?   Thanks

## 2022-05-03 ENCOUNTER — TELEPHONE (OUTPATIENT)
Dept: FAMILY MEDICINE | Facility: CLINIC | Age: 52
End: 2022-05-03
Payer: COMMERCIAL

## 2022-05-03 NOTE — TELEPHONE ENCOUNTER
It just started last week. It started before taking the Zocor. She think it may be arthritis. She says it started with her lower back now to her upper back. Wants to know if the arthritis is moving around. Please advise?

## 2022-05-03 NOTE — TELEPHONE ENCOUNTER
"Advise pt arthritis does not "move around;" however, she may have arthritis in her back and just have pain at various areas at different times. Mobic is listed on her medication list; I recommend she take Mobic as directed for back pain. Thanks.  "

## 2022-05-03 NOTE — TELEPHONE ENCOUNTER
"Pt portal message    "Hello,   I just want to add to our conversation this morning, I am trying to remember what day I started takin the Zocar.  I am not sure if it the pain started when I began taking the Zocar, I not exactly sure what day I began taking, I think it was between 4/28-4/30. I do remember that during those days I had lower back pain on my left side, I woke up Monday morning 5/1/22 with the same pain but it has moved to my upper back on the same left side. Also, when I stopped taking it from January until last week I didn't have back pain. Sorry for my delayed thoughts.   Thanks'  "

## 2022-05-12 ENCOUNTER — PATIENT MESSAGE (OUTPATIENT)
Dept: FAMILY MEDICINE | Facility: CLINIC | Age: 52
End: 2022-05-12
Payer: COMMERCIAL

## 2022-05-24 ENCOUNTER — IMMUNIZATION (OUTPATIENT)
Dept: PRIMARY CARE CLINIC | Facility: CLINIC | Age: 52
End: 2022-05-24
Payer: COMMERCIAL

## 2022-05-24 DIAGNOSIS — Z23 NEED FOR VACCINATION: Primary | ICD-10-CM

## 2022-05-24 PROCEDURE — 91305 COVID-19, MRNA, LNP-S, PF, 30 MCG/0.3 ML DOSE VACCINE (PFIZER): CPT | Mod: PBBFAC | Performed by: FAMILY MEDICINE

## 2022-06-01 DIAGNOSIS — I10 ESSENTIAL HYPERTENSION: Primary | ICD-10-CM

## 2022-06-03 ENCOUNTER — OFFICE VISIT (OUTPATIENT)
Dept: CARDIOLOGY | Facility: CLINIC | Age: 52
End: 2022-06-03
Payer: COMMERCIAL

## 2022-06-03 ENCOUNTER — HOSPITAL ENCOUNTER (OUTPATIENT)
Dept: CARDIOLOGY | Facility: HOSPITAL | Age: 52
Discharge: HOME | End: 2022-06-03
Attending: INTERNAL MEDICINE
Payer: COMMERCIAL

## 2022-06-03 VITALS
WEIGHT: 267.88 LBS | SYSTOLIC BLOOD PRESSURE: 138 MMHG | OXYGEN SATURATION: 96 % | DIASTOLIC BLOOD PRESSURE: 89 MMHG | BODY MASS INDEX: 43.23 KG/M2 | HEART RATE: 74 BPM

## 2022-06-03 DIAGNOSIS — E78.2 MIXED HYPERLIPIDEMIA: ICD-10-CM

## 2022-06-03 DIAGNOSIS — I10 ESSENTIAL HYPERTENSION: ICD-10-CM

## 2022-06-03 DIAGNOSIS — I10 ESSENTIAL HYPERTENSION: Primary | ICD-10-CM

## 2022-06-03 DIAGNOSIS — I73.9 PVD (PERIPHERAL VASCULAR DISEASE): ICD-10-CM

## 2022-06-03 PROCEDURE — 99999 PR PBB SHADOW E&M-EST. PATIENT-LVL III: ICD-10-PCS | Mod: PBBFAC,,, | Performed by: INTERNAL MEDICINE

## 2022-06-03 PROCEDURE — 99214 OFFICE O/P EST MOD 30 MIN: CPT | Mod: S$GLB,,, | Performed by: INTERNAL MEDICINE

## 2022-06-03 PROCEDURE — 1159F PR MEDICATION LIST DOCUMENTED IN MEDICAL RECORD: ICD-10-PCS | Mod: CPTII,S$GLB,, | Performed by: INTERNAL MEDICINE

## 2022-06-03 PROCEDURE — 93010 EKG 12-LEAD: ICD-10-PCS | Mod: ,,, | Performed by: STUDENT IN AN ORGANIZED HEALTH CARE EDUCATION/TRAINING PROGRAM

## 2022-06-03 PROCEDURE — 93005 ELECTROCARDIOGRAM TRACING: CPT

## 2022-06-03 PROCEDURE — 3008F PR BODY MASS INDEX (BMI) DOCUMENTED: ICD-10-PCS | Mod: CPTII,S$GLB,, | Performed by: INTERNAL MEDICINE

## 2022-06-03 PROCEDURE — 1160F PR REVIEW ALL MEDS BY PRESCRIBER/CLIN PHARMACIST DOCUMENTED: ICD-10-PCS | Mod: CPTII,S$GLB,, | Performed by: INTERNAL MEDICINE

## 2022-06-03 PROCEDURE — 93010 ELECTROCARDIOGRAM REPORT: CPT | Mod: ,,, | Performed by: STUDENT IN AN ORGANIZED HEALTH CARE EDUCATION/TRAINING PROGRAM

## 2022-06-03 PROCEDURE — 1160F RVW MEDS BY RX/DR IN RCRD: CPT | Mod: CPTII,S$GLB,, | Performed by: INTERNAL MEDICINE

## 2022-06-03 PROCEDURE — 3079F DIAST BP 80-89 MM HG: CPT | Mod: CPTII,S$GLB,, | Performed by: INTERNAL MEDICINE

## 2022-06-03 PROCEDURE — 3044F PR MOST RECENT HEMOGLOBIN A1C LEVEL <7.0%: ICD-10-PCS | Mod: CPTII,S$GLB,, | Performed by: INTERNAL MEDICINE

## 2022-06-03 PROCEDURE — 3044F HG A1C LEVEL LT 7.0%: CPT | Mod: CPTII,S$GLB,, | Performed by: INTERNAL MEDICINE

## 2022-06-03 PROCEDURE — 3079F PR MOST RECENT DIASTOLIC BLOOD PRESSURE 80-89 MM HG: ICD-10-PCS | Mod: CPTII,S$GLB,, | Performed by: INTERNAL MEDICINE

## 2022-06-03 PROCEDURE — 99214 PR OFFICE/OUTPT VISIT, EST, LEVL IV, 30-39 MIN: ICD-10-PCS | Mod: S$GLB,,, | Performed by: INTERNAL MEDICINE

## 2022-06-03 PROCEDURE — 99999 PR PBB SHADOW E&M-EST. PATIENT-LVL III: CPT | Mod: PBBFAC,,, | Performed by: INTERNAL MEDICINE

## 2022-06-03 PROCEDURE — 3075F SYST BP GE 130 - 139MM HG: CPT | Mod: CPTII,S$GLB,, | Performed by: INTERNAL MEDICINE

## 2022-06-03 PROCEDURE — 3075F PR MOST RECENT SYSTOLIC BLOOD PRESS GE 130-139MM HG: ICD-10-PCS | Mod: CPTII,S$GLB,, | Performed by: INTERNAL MEDICINE

## 2022-06-03 PROCEDURE — 3008F BODY MASS INDEX DOCD: CPT | Mod: CPTII,S$GLB,, | Performed by: INTERNAL MEDICINE

## 2022-06-03 PROCEDURE — 1159F MED LIST DOCD IN RCRD: CPT | Mod: CPTII,S$GLB,, | Performed by: INTERNAL MEDICINE

## 2022-06-03 NOTE — PROGRESS NOTES
Subjective:   Patient ID:  Aliza Sagastume is a 51 y.o. female who presents for follow up of No chief complaint on file.      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 Dr. Lantigua    PMH HTN, HLD, obesity SHAWNA on CPAP, preDM.  Improved fatigue. No chest pain.  LEAL if walking fast. Has knee pain while walking  Ankle well at night and better in morning. On MObic  EKG NSR in   No smoking and occasional drinking  Father 1st heart attack at 60. Mother HTN. No f/h premature CAD.   ECHO normal function and ETT METS 4 , high BP and no ischemia.  BP LDL and BS controlled    Interval history  Done the weight loss procedure and lost 125 pounds.  No chest pain dyspnea dizziness faint and leg swelling   Good appetite, no regular exercise  Resumed simvastatin for HLD        Past Medical History:   Diagnosis Date    CEDRICK positive 08/2017    High cholesterol     History of vitamin D deficiency     Hypertension     Hypothyroidism     Insulin resistance 9/12/2013    Kidney stones     Morbid obesity with BMI of 60.0-69.9, adult     Postmenopausal     Pre-diabetes     Snoring        Past Surgical History:   Procedure Laterality Date    CYSTOSCOPY W/ LASER LITHOTRIPSY      x 2    DILATION AND CURETTAGE OF UTERUS      x1 for abnormal bleeding    gastric sleeve  04/22/2021    HYSTERECTOMY      UTERINE FIBROID SURGERY  2019       Social History     Tobacco Use    Smoking status: Never Smoker    Smokeless tobacco: Never Used   Substance Use Topics    Alcohol use: Yes     Alcohol/week: 0.0 standard drinks     Comment: occasionally    Drug use: No       Family History   Problem Relation Age of Onset    Hypertension Mother     Heart disease Father 69        MI    Hypertension Father     Diabetes Father     Cancer Father         prostate cancer    Hypertension Sister     Cancer Paternal Aunt         Brain cancer    Cancer  Paternal Uncle         Pancreas cancer    Hypertension Maternal Grandmother     Hypertension Maternal Grandfather     Stroke Maternal Grandfather 86    Hypertension Paternal Grandmother     Hypertension Paternal Grandfather     No Known Problems Daughter     No Known Problems Daughter     Cancer Cousin         Breast cancer    Breast cancer Paternal Cousin          Review of Systems   Constitutional: Positive for malaise/fatigue. Negative for decreased appetite, diaphoresis, fever and night sweats.   HENT: Negative for nosebleeds.    Eyes: Negative for blurred vision and double vision.   Cardiovascular: Positive for dyspnea on exertion. Negative for chest pain, claudication, irregular heartbeat, leg swelling, near-syncope, orthopnea, palpitations, paroxysmal nocturnal dyspnea and syncope.   Respiratory: Positive for snoring. Negative for cough, shortness of breath, sleep disturbances due to breathing, sputum production and wheezing.    Endocrine: Negative for cold intolerance and polyuria.   Hematologic/Lymphatic: Does not bruise/bleed easily.   Skin: Negative for rash.   Musculoskeletal: Negative for back pain, falls, joint pain, joint swelling and neck pain.   Gastrointestinal: Negative for abdominal pain, heartburn, nausea and vomiting.   Genitourinary: Negative for dysuria, frequency and hematuria.   Neurological: Negative for difficulty with concentration, dizziness, focal weakness, headaches, light-headedness, numbness, seizures and weakness.   Psychiatric/Behavioral: Negative for depression, memory loss and substance abuse. The patient does not have insomnia.    Allergic/Immunologic: Negative for HIV exposure and hives.       Objective:   Physical Exam  HENT:      Head: Normocephalic.   Eyes:      Pupils: Pupils are equal, round, and reactive to light.   Neck:      Thyroid: No thyromegaly.      Vascular: Normal carotid pulses. No carotid bruit or JVD.   Cardiovascular:      Rate and Rhythm: Normal  rate and regular rhythm.  No extrasystoles are present.     Chest Wall: PMI is not displaced.      Pulses: Normal pulses.      Heart sounds: Normal heart sounds. No murmur heard.    No gallop. No S3 sounds.   Pulmonary:      Effort: No respiratory distress.      Breath sounds: Normal breath sounds. No stridor.   Abdominal:      General: Bowel sounds are normal.      Palpations: Abdomen is soft.      Tenderness: There is no abdominal tenderness. There is no rebound.   Musculoskeletal:         General: Normal range of motion.   Skin:     Findings: No rash.   Neurological:      Mental Status: She is alert and oriented to person, place, and time.   Psychiatric:         Behavior: Behavior normal.         Lab Results   Component Value Date    CHOL 269 (H) 04/25/2022    CHOL 215 (H) 01/18/2022    CHOL 210 (H) 08/11/2020     Lab Results   Component Value Date    HDL 78 (H) 04/25/2022    HDL 72 01/18/2022    HDL 66 08/11/2020     Lab Results   Component Value Date    LDLCALC 177.2 (H) 04/25/2022    LDLCALC 128.2 01/18/2022    LDLCALC 128.4 08/11/2020     Lab Results   Component Value Date    TRIG 69 04/25/2022    TRIG 74 01/18/2022    TRIG 78 08/11/2020     Lab Results   Component Value Date    CHOLHDL 29.0 04/25/2022    CHOLHDL 33.5 01/18/2022    CHOLHDL 31.4 08/11/2020       Chemistry        Component Value Date/Time     (L) 04/25/2022 0812    K 3.6 04/25/2022 0812    CL 97 04/25/2022 0812    CO2 26 04/25/2022 0812    BUN 33 (H) 04/25/2022 0812    CREATININE 1.0 04/25/2022 0812    GLU 83 04/25/2022 0812        Component Value Date/Time    CALCIUM 10.0 04/25/2022 0812    ALKPHOS 73 01/18/2022 1318    AST 15 01/18/2022 1318    ALT 11 01/18/2022 1318    BILITOT 0.5 01/18/2022 1318    ESTGFRAFRICA >60.0 04/25/2022 0812    EGFRNONAA >60.0 04/25/2022 0812          Lab Results   Component Value Date    HGBA1C 5.5 01/18/2022     Lab Results   Component Value Date    TSH 3.691 01/18/2022     Lab Results   Component Value Date     INR 1.0 07/30/2013     Lab Results   Component Value Date    WBC 4.20 04/25/2022    HGB 13.3 04/25/2022    HCT 43.3 04/25/2022    MCV 84 04/25/2022     04/25/2022     BMP  Sodium   Date Value Ref Range Status   04/25/2022 135 (L) 136 - 145 mmol/L Final     Potassium   Date Value Ref Range Status   04/25/2022 3.6 3.5 - 5.1 mmol/L Final     Chloride   Date Value Ref Range Status   04/25/2022 97 95 - 110 mmol/L Final     CO2   Date Value Ref Range Status   04/25/2022 26 23 - 29 mmol/L Final     BUN   Date Value Ref Range Status   04/25/2022 33 (H) 6 - 20 mg/dL Final     Creatinine   Date Value Ref Range Status   04/25/2022 1.0 0.5 - 1.4 mg/dL Final     Calcium   Date Value Ref Range Status   04/25/2022 10.0 8.7 - 10.5 mg/dL Final     Anion Gap   Date Value Ref Range Status   04/25/2022 12 8 - 16 mmol/L Final     eGFR if    Date Value Ref Range Status   04/25/2022 >60.0 >60 mL/min/1.73 m^2 Final     eGFR if non    Date Value Ref Range Status   04/25/2022 >60.0 >60 mL/min/1.73 m^2 Final     Comment:     Calculation used to obtain the estimated glomerular filtration  rate (eGFR) is the CKD-EPI equation.        BNP  @LABRCNTIP(BNP,BNPTRIAGEBLO)@  @LABRCNTIP(troponini)@  CrCl cannot be calculated (Patient's most recent lab result is older than the maximum 7 days allowed.).  No results found in the last 24 hours.  No results found in the last 24 hours.  No results found in the last 24 hours.    Assessment:      1. Essential hypertension    2. Mixed hyperlipidemia    3. PVD (peripheral vascular disease)        Plan:   Continue olmesartan HCTz and statin for HTn HLD and PVD    Counseled DASH  Check Lipid profile in 6 months  Recommend heart-healthy diet, weight control and regular exercise.  Oh. Risk modification.   I have reviewed all pertinent labs and cardiac studies independently. Plans and recommendations have been formulated under my direct supervision. All questions answered  and patient voiced understanding.   If symptoms persist go to the ED  RTC in 12 months

## 2022-11-21 ENCOUNTER — PATIENT MESSAGE (OUTPATIENT)
Dept: ADMINISTRATIVE | Facility: HOSPITAL | Age: 52
End: 2022-11-21
Payer: COMMERCIAL

## 2023-02-22 DIAGNOSIS — Z12.31 OTHER SCREENING MAMMOGRAM: ICD-10-CM

## 2023-04-19 ENCOUNTER — PATIENT MESSAGE (OUTPATIENT)
Dept: ADMINISTRATIVE | Facility: HOSPITAL | Age: 53
End: 2023-04-19
Payer: COMMERCIAL

## 2023-11-26 NOTE — NURSING
Pressure held on left breast biopsy site for 10 mins, hemostasis was achieved, steri strips were applied, and wound was covered with 4x4 gauze and a tegaderm. Dressing clean, dry and intact with no drainage noted.  Discharge instructions given verbally and in writing, patient voiced understandings.  Patient discharged and accompanied by family member.   [phone]